# Patient Record
Sex: FEMALE | Race: BLACK OR AFRICAN AMERICAN | NOT HISPANIC OR LATINO
[De-identification: names, ages, dates, MRNs, and addresses within clinical notes are randomized per-mention and may not be internally consistent; named-entity substitution may affect disease eponyms.]

---

## 2022-12-29 ENCOUNTER — TRANSCRIPTION ENCOUNTER (OUTPATIENT)
Age: 52
End: 2022-12-29

## 2022-12-30 ENCOUNTER — INPATIENT (INPATIENT)
Facility: HOSPITAL | Age: 52
LOS: 2 days | Discharge: HOME | End: 2023-01-02
Attending: STUDENT IN AN ORGANIZED HEALTH CARE EDUCATION/TRAINING PROGRAM | Admitting: STUDENT IN AN ORGANIZED HEALTH CARE EDUCATION/TRAINING PROGRAM
Payer: COMMERCIAL

## 2022-12-30 VITALS
TEMPERATURE: 98 F | SYSTOLIC BLOOD PRESSURE: 138 MMHG | RESPIRATION RATE: 30 BRPM | OXYGEN SATURATION: 92 % | HEART RATE: 104 BPM | DIASTOLIC BLOOD PRESSURE: 86 MMHG

## 2022-12-30 DIAGNOSIS — J93.9 PNEUMOTHORAX, UNSPECIFIED: ICD-10-CM

## 2022-12-30 LAB
ALBUMIN SERPL ELPH-MCNC: 4.6 G/DL — SIGNIFICANT CHANGE UP (ref 3.3–5)
ALP SERPL-CCNC: 76 U/L — SIGNIFICANT CHANGE UP (ref 40–120)
ALT FLD-CCNC: 9 U/L — SIGNIFICANT CHANGE UP (ref 4–33)
ANION GAP SERPL CALC-SCNC: 15 MMOL/L — HIGH (ref 7–14)
APTT BLD: 26 SEC — LOW (ref 27–36.3)
AST SERPL-CCNC: 18 U/L — SIGNIFICANT CHANGE UP (ref 4–32)
BASE EXCESS BLDV CALC-SCNC: -2.2 MMOL/L — LOW (ref -2–3)
BASOPHILS # BLD AUTO: 0.02 K/UL — SIGNIFICANT CHANGE UP (ref 0–0.2)
BASOPHILS NFR BLD AUTO: 0.1 % — SIGNIFICANT CHANGE UP (ref 0–2)
BILIRUB SERPL-MCNC: 0.4 MG/DL — SIGNIFICANT CHANGE UP (ref 0.2–1.2)
BLD GP AB SCN SERPL QL: NEGATIVE — SIGNIFICANT CHANGE UP
BLOOD GAS VENOUS COMPREHENSIVE RESULT: SIGNIFICANT CHANGE UP
BUN SERPL-MCNC: 6 MG/DL — LOW (ref 7–23)
CALCIUM SERPL-MCNC: 9.4 MG/DL — SIGNIFICANT CHANGE UP (ref 8.4–10.5)
CHLORIDE BLDV-SCNC: 106 MMOL/L — SIGNIFICANT CHANGE UP (ref 96–108)
CHLORIDE SERPL-SCNC: 103 MMOL/L — SIGNIFICANT CHANGE UP (ref 98–107)
CK MB BLD-MCNC: 3.9 % — HIGH (ref 0–2.5)
CK MB BLD-MCNC: 4.7 % — HIGH (ref 0–2.5)
CK MB CFR SERPL CALC: 5 NG/ML — HIGH
CK MB CFR SERPL CALC: 5.9 NG/ML — HIGH
CK SERPL-CCNC: 125 U/L — SIGNIFICANT CHANGE UP (ref 25–170)
CK SERPL-CCNC: 129 U/L — SIGNIFICANT CHANGE UP (ref 25–170)
CO2 BLDV-SCNC: 25.1 MMOL/L — SIGNIFICANT CHANGE UP (ref 22–26)
CO2 SERPL-SCNC: 22 MMOL/L — SIGNIFICANT CHANGE UP (ref 22–31)
CREAT SERPL-MCNC: 0.89 MG/DL — SIGNIFICANT CHANGE UP (ref 0.5–1.3)
EGFR: 79 ML/MIN/1.73M2 — SIGNIFICANT CHANGE UP
EOSINOPHIL # BLD AUTO: 0 K/UL — SIGNIFICANT CHANGE UP (ref 0–0.5)
EOSINOPHIL NFR BLD AUTO: 0 % — SIGNIFICANT CHANGE UP (ref 0–6)
GAS PNL BLDV: 138 MMOL/L — SIGNIFICANT CHANGE UP (ref 136–145)
GLUCOSE BLDV-MCNC: 123 MG/DL — HIGH (ref 70–99)
GLUCOSE SERPL-MCNC: 129 MG/DL — HIGH (ref 70–99)
HCG SERPL-ACNC: <5 MIU/ML — SIGNIFICANT CHANGE UP
HCO3 BLDV-SCNC: 24 MMOL/L — SIGNIFICANT CHANGE UP (ref 22–29)
HCT VFR BLD CALC: 31.9 % — LOW (ref 34.5–45)
HCT VFR BLDA CALC: 28 % — LOW (ref 34.5–46.5)
HGB BLD CALC-MCNC: 9.3 G/DL — LOW (ref 11.5–15.5)
HGB BLD-MCNC: 9.3 G/DL — LOW (ref 11.5–15.5)
IANC: 11.58 K/UL — HIGH (ref 1.8–7.4)
IMM GRANULOCYTES NFR BLD AUTO: 0.4 % — SIGNIFICANT CHANGE UP (ref 0–0.9)
INR BLD: 1.39 RATIO — HIGH (ref 0.88–1.16)
LACTATE BLDV-MCNC: 2.6 MMOL/L — HIGH (ref 0.5–2)
LIDOCAIN IGE QN: 22 U/L — SIGNIFICANT CHANGE UP (ref 7–60)
LYMPHOCYTES # BLD AUTO: 1.5 K/UL — SIGNIFICANT CHANGE UP (ref 1–3.3)
LYMPHOCYTES # BLD AUTO: 10.5 % — LOW (ref 13–44)
MAGNESIUM SERPL-MCNC: 2.2 MG/DL — SIGNIFICANT CHANGE UP (ref 1.6–2.6)
MCHC RBC-ENTMCNC: 17.4 PG — LOW (ref 27–34)
MCHC RBC-ENTMCNC: 29.2 GM/DL — LOW (ref 32–36)
MCV RBC AUTO: 59.8 FL — LOW (ref 80–100)
MONOCYTES # BLD AUTO: 1.09 K/UL — HIGH (ref 0–0.9)
MONOCYTES NFR BLD AUTO: 7.6 % — SIGNIFICANT CHANGE UP (ref 2–14)
NEUTROPHILS # BLD AUTO: 11.58 K/UL — HIGH (ref 1.8–7.4)
NEUTROPHILS NFR BLD AUTO: 81.4 % — HIGH (ref 43–77)
NRBC # BLD: 0 /100 WBCS — SIGNIFICANT CHANGE UP (ref 0–0)
NRBC # FLD: 0 K/UL — SIGNIFICANT CHANGE UP (ref 0–0)
NT-PROBNP SERPL-SCNC: 2221 PG/ML — HIGH
PCO2 BLDV: 45 MMHG — HIGH (ref 39–42)
PH BLDV: 7.33 — SIGNIFICANT CHANGE UP (ref 7.32–7.43)
PLATELET # BLD AUTO: 611 K/UL — HIGH (ref 150–400)
PO2 BLDV: 29 MMHG — SIGNIFICANT CHANGE UP
POTASSIUM BLDV-SCNC: 3.8 MMOL/L — SIGNIFICANT CHANGE UP (ref 3.5–5.1)
POTASSIUM SERPL-MCNC: 4 MMOL/L — SIGNIFICANT CHANGE UP (ref 3.5–5.3)
POTASSIUM SERPL-SCNC: 4 MMOL/L — SIGNIFICANT CHANGE UP (ref 3.5–5.3)
PROT SERPL-MCNC: 8.5 G/DL — HIGH (ref 6–8.3)
PROTHROM AB SERPL-ACNC: 16.2 SEC — HIGH (ref 10.5–13.4)
RBC # BLD: 5.33 M/UL — HIGH (ref 3.8–5.2)
RBC # FLD: 20.9 % — HIGH (ref 10.3–14.5)
RH IG SCN BLD-IMP: POSITIVE — SIGNIFICANT CHANGE UP
RH IG SCN BLD-IMP: POSITIVE — SIGNIFICANT CHANGE UP
SAO2 % BLDV: 35.4 % — SIGNIFICANT CHANGE UP
SARS-COV-2 RNA SPEC QL NAA+PROBE: SIGNIFICANT CHANGE UP
SODIUM SERPL-SCNC: 140 MMOL/L — SIGNIFICANT CHANGE UP (ref 135–145)
TROPONIN T, HIGH SENSITIVITY RESULT: 56 NG/L — CRITICAL HIGH
TROPONIN T, HIGH SENSITIVITY RESULT: 74 NG/L — CRITICAL HIGH
WBC # BLD: 14.25 K/UL — HIGH (ref 3.8–10.5)
WBC # FLD AUTO: 14.25 K/UL — HIGH (ref 3.8–10.5)

## 2022-12-30 PROCEDURE — 99223 1ST HOSP IP/OBS HIGH 75: CPT | Mod: 57,25

## 2022-12-30 PROCEDURE — 93010 ELECTROCARDIOGRAM REPORT: CPT

## 2022-12-30 PROCEDURE — 71045 X-RAY EXAM CHEST 1 VIEW: CPT | Mod: 26,76

## 2022-12-30 PROCEDURE — 32551 INSERTION OF CHEST TUBE: CPT

## 2022-12-30 PROCEDURE — 99285 EMERGENCY DEPT VISIT HI MDM: CPT

## 2022-12-30 PROCEDURE — 71045 X-RAY EXAM CHEST 1 VIEW: CPT | Mod: 26,77

## 2022-12-30 RX ORDER — FERROUS SULFATE 325(65) MG
325 TABLET ORAL DAILY
Refills: 0 | Status: DISCONTINUED | OUTPATIENT
Start: 2022-12-30 | End: 2023-01-02

## 2022-12-30 RX ORDER — LIDOCAINE HCL 20 MG/ML
10 VIAL (ML) INJECTION ONCE
Refills: 0 | Status: COMPLETED | OUTPATIENT
Start: 2022-12-30 | End: 2022-12-30

## 2022-12-30 RX ORDER — POLYETHYLENE GLYCOL 3350 17 G/17G
17 POWDER, FOR SOLUTION ORAL DAILY
Refills: 0 | Status: DISCONTINUED | OUTPATIENT
Start: 2022-12-30 | End: 2023-01-02

## 2022-12-30 RX ORDER — OXYCODONE HYDROCHLORIDE 5 MG/1
5 TABLET ORAL EVERY 6 HOURS
Refills: 0 | Status: DISCONTINUED | OUTPATIENT
Start: 2022-12-30 | End: 2023-01-02

## 2022-12-30 RX ORDER — HEPARIN SODIUM 5000 [USP'U]/ML
5000 INJECTION INTRAVENOUS; SUBCUTANEOUS EVERY 12 HOURS
Refills: 0 | Status: DISCONTINUED | OUTPATIENT
Start: 2022-12-30 | End: 2023-01-02

## 2022-12-30 RX ORDER — ACETAMINOPHEN 500 MG
650 TABLET ORAL EVERY 6 HOURS
Refills: 0 | Status: DISCONTINUED | OUTPATIENT
Start: 2022-12-30 | End: 2023-01-02

## 2022-12-30 RX ORDER — OXYCODONE HYDROCHLORIDE 5 MG/1
10 TABLET ORAL EVERY 6 HOURS
Refills: 0 | Status: DISCONTINUED | OUTPATIENT
Start: 2022-12-30 | End: 2023-01-02

## 2022-12-30 RX ORDER — MIDAZOLAM HYDROCHLORIDE 1 MG/ML
2 INJECTION, SOLUTION INTRAMUSCULAR; INTRAVENOUS ONCE
Refills: 0 | Status: DISCONTINUED | OUTPATIENT
Start: 2022-12-30 | End: 2022-12-30

## 2022-12-30 RX ORDER — SENNA PLUS 8.6 MG/1
2 TABLET ORAL AT BEDTIME
Refills: 0 | Status: DISCONTINUED | OUTPATIENT
Start: 2022-12-30 | End: 2023-01-02

## 2022-12-30 RX ORDER — ACETAMINOPHEN 500 MG
1000 TABLET ORAL ONCE
Refills: 0 | Status: COMPLETED | OUTPATIENT
Start: 2022-12-30 | End: 2022-12-30

## 2022-12-30 RX ADMIN — MIDAZOLAM HYDROCHLORIDE 2 MILLIGRAM(S): 1 INJECTION, SOLUTION INTRAMUSCULAR; INTRAVENOUS at 16:15

## 2022-12-30 RX ADMIN — Medication 400 MILLIGRAM(S): at 15:15

## 2022-12-30 RX ADMIN — Medication 10 MILLILITER(S): at 18:29

## 2022-12-30 RX ADMIN — SENNA PLUS 2 TABLET(S): 8.6 TABLET ORAL at 22:15

## 2022-12-30 RX ADMIN — MIDAZOLAM HYDROCHLORIDE 2 MILLIGRAM(S): 1 INJECTION, SOLUTION INTRAMUSCULAR; INTRAVENOUS at 17:58

## 2022-12-30 RX ADMIN — OXYCODONE HYDROCHLORIDE 10 MILLIGRAM(S): 5 TABLET ORAL at 22:15

## 2022-12-30 NOTE — ED ADULT NURSE REASSESSMENT NOTE - NS ED NURSE REASSESS COMMENT FT1
Pt resting in bed comfortably, NAD. Pt informed of pneumothorax and need for chest tube placement by provider. Pt agrees, consent signed. Providers at bedside for procedure

## 2022-12-30 NOTE — H&P ADULT - ASSESSMENT
53 yo F h/o anemia, prediabetic, HTN p/w to ER with 5 days of progressively worsening ZELAYA/SOB and exercise intolerance with CXR showing large right pneumothorax.  Right pigtail cath placed with reexpansion, chest tube to suction with forced expiratory air leak.    Plan:   Admit to Thoracic Surgery- Dr. Burton    Neuro: Pain management  Pulm: Encourage coughing, deep breathing and use of incentive spirometry. Wean off supplemental oxygen as able. Daily CXR.   Cardio: Monitor telemetry/alarms  GI: Tolerating diet. Continue stool softeners.  Renal: monitor urine output, supplement electrolytes as needed  Vasc: Heparin SC/SCDs for DVT prophylaxis  Heme: Stable H/H. .   ID: Off antibiotics. Stable.  Therapy: OOB/ambulate  Tubes: Monitor Chest tube output and air leak  Discussed with Dr. Burton

## 2022-12-30 NOTE — H&P ADULT - NSHPLABSRESULTS_GEN_ALL_CORE
Lab Results:  CBC  CBC Full  -  ( 30 Dec 2022 14:20 )  WBC Count : 14.25 K/uL  RBC Count : 5.33 M/uL  Hemoglobin : 9.3 g/dL  Hematocrit : 31.9 %  Platelet Count - Automated : 611 K/uL  Mean Cell Volume : 59.8 fL  Mean Cell Hemoglobin : 17.4 pg  Mean Cell Hemoglobin Concentration : 29.2 gm/dL  Auto Neutrophil # : 11.58 K/uL  Auto Lymphocyte # : 1.50 K/uL  Auto Monocyte # : 1.09 K/uL  Auto Eosinophil # : 0.00 K/uL  Auto Basophil # : 0.02 K/uL  Auto Neutrophil % : 81.4 %  Auto Lymphocyte % : 10.5 %  Auto Monocyte % : 7.6 %  Auto Eosinophil % : 0.0 %  Auto Basophil % : 0.1 %    .		Differential:	[] Automated		[] Manual  Chemistry                        9.3    14.25 )-----------( 611      ( 30 Dec 2022 14:20 )             31.9     12-30    140  |  103  |  6<L>  ----------------------------<  129<H>  4.0   |  22  |  0.89    Ca    9.4      30 Dec 2022 14:20  Mg     2.20     12-30    TPro  8.5<H>  /  Alb  4.6  /  TBili  0.4  /  DBili  x   /  AST  18  /  ALT  9   /  AlkPhos  76  12-30    LIVER FUNCTIONS - ( 30 Dec 2022 14:20 )  Alb: 4.6 g/dL / Pro: 8.5 g/dL / ALK PHOS: 76 U/L / ALT: 9 U/L / AST: 18 U/L / GGT: x           PT/INR - ( 30 Dec 2022 14:20 )   PT: 16.2 sec;   INR: 1.39 ratio         PTT - ( 30 Dec 2022 14:20 )  PTT:26.0 sec      RADIOLOGY RESULTS:  CXR: Large right pneumothorax   Repeat CXR after PTC insertion showing reexpansion

## 2022-12-30 NOTE — H&P ADULT - HISTORY OF PRESENT ILLNESS
53 yo F h/o anemia, prediabetic, HTN p/w to ER with 5 days of progressively worsening ZELAYA/SOB and exercise intolerance. Last night develop SSCP described as pressure-like lasting several hours as well as upper back pain today. Denies fever/chills, recent URI infection, n/v/d, palp, weight change.  Chest x-ray in ER showing large right pneumothorax, pigtail cath attempted by ER twice but unable to place and thoracic surgery contacted for placement of chest tube.  Pigtail cath placed without complication, repeat chest x-ray showing reexpansion.  Patient admitted for management of chest tube.

## 2022-12-30 NOTE — PROCEDURAL SAFETY CHECKLIST WITH OR WITHOUT SEDATION - NSPOSTCOMMENTFT_GEN_ALL_CORE
Pt tolerated procedure well. Pt pending x-ray for placement confirmation. Dressing clean, dry, and intact
cardio thoracic paged for tube insertion

## 2022-12-30 NOTE — ED PROVIDER NOTE - PHYSICAL EXAMINATION
Attending/Jennyfer: +Acc resp mm use; PERRl/EOMI, mucosa-slightly pale; supple, no JVD; RRR, RLL diminished BS; Abd--soft, NT/ND, no LE edema, +2 DP/PT; A&Ox3, nonfocal; Skin-warm/dry Attending/Jennyfer: +Acc resp mm use; PERRl/EOMI, mucosa-slightly pale; supple, no JVD; RRR, RLL diminished BS, sat 87% on RA; Abd--soft, NT/ND, no LE edema, +2 DP/PT; A&Ox3, nonfocal; Skin-warm/dry

## 2022-12-30 NOTE — ED ADULT NURSE NOTE - OBJECTIVE STATEMENT
Pt received in trauma room C A@Ox4, ambulatory at baseline. Pt sister in room at bedside. Pt is a 51 y/o F with PMHx of anemia presents to ED with c/o SOB x 1 week. Pt sister reports SOB began 1 week ago along with ZELAYA. Pt reports 1 episode of non radiating chest pain last night. Pt tachycardic and tachypneic, respirations are even and unlabored, skin intact. Labs drawn and sent as per provider orders. Awaiting CT scan

## 2022-12-30 NOTE — H&P ADULT - NS ATTEND AMEND GEN_ALL_CORE FT
pneumothorax - first known occurrence,  pigtail placement and if resolves can manage conservatively, if fails discussed possible surgical intervention

## 2022-12-30 NOTE — ED ADULT TRIAGE NOTE - CHIEF COMPLAINT QUOTE
Patient brought to ER by EMS from home for shortness of breath and weakness. Pt has anemia and has had this happen before.  Patient brought to ER by EMS from home for shortness of breath, weakness and back pain. Pt has anemia and has had this happen before.  Patient brought to ER by EMS from home for shortness of breath, weakness and back pain. Pt has anemia and has had this happen before.   Evaluation of EKG>R/O Stemi

## 2022-12-30 NOTE — H&P ADULT - NSHPPHYSICALEXAM_GEN_ALL_CORE
Vital Signs Last 24 Hrs  T(C): 36.7 (30 Dec 2022 14:11), Max: 36.8 (30 Dec 2022 13:16)  T(F): 98 (30 Dec 2022 14:11), Max: 98.2 (30 Dec 2022 13:16)  HR: 84 (30 Dec 2022 20:05) (84 - 112)  BP: 103/71 (30 Dec 2022 20:05) (103/71 - 142/88)  BP(mean): --  RR: 17 (30 Dec 2022 20:05) (17 - 32)  SpO2: 100% (30 Dec 2022 20:05) (92% - 100%)    Parameters below as of 30 Dec 2022 18:16  Patient On (Oxygen Delivery Method): mask, nonrebreather  O2 Flow (L/min): 15    General: WN/WD NAD  Neurology: A&Ox3, nonfocal, YANEZ x 4  Eyes: PERRLA/ EOMI, Gross vision intact  ENT/Neck: Neck supple, trachea midline, No JVD, Gross hearing intact  Respiratory: decreased right side   CV: RRR, S1S2, no murmurs, rubs or gallops  Abdominal: Soft, NT, ND +BS,   Extremities: No edema, + peripheral pulses  Skin: No Rashes, Hematoma, Ecchymosis  Tubes: Right chest tube to suction, +AL

## 2022-12-30 NOTE — ED PROVIDER NOTE - OBJECTIVE STATEMENT
Attending/Jennyfer: 51 yo F h/o anemia on Iron suppl (no h/o transfusion), prediabetic, HTN p/w ~5 days of progressively worsening ZELAYA/SOB and exercise tolerance. Last night develop SSCP described as pressure-like lasting several hours as well as upper back pain today. Denies fever/chills, recent URI infection, n/v/d, palp, weight change. Attending/Jennyfer: 53 yo F h/o anemia on Iron suppl (no h/o transfusion), prediabetic, HTN p/w ~5 days of progressively worsening ZELAYA/SOB and exercise intolerance. Last night develop SSCP described as pressure-like lasting several hours as well as upper back pain today. Denies fever/chills, recent URI infection, n/v/d, palp, weight change.

## 2022-12-30 NOTE — ED PROVIDER NOTE - PROGRESS NOTE DETAILS
Jennyfer: cardiology at bedside, believe not acute STEMI. Work up plan d/w patient and her sister who is at bedside. Rommel Berkowitz MD (PGY2): CXR w/ large R pneumothorax. Confirmed with call from radiology. will place pigtail catheter. Jaz Meeks PGY3: Rt-sided pigtail catheter placement attempted x 2 and unsuccessful. Given this, CT surgery consulted for chest tube placement. Jaz Meeks PGY3: Pigtail placed by CT Surgery. Awaiting confirmatory CXR. Will admit to CT surgery service for monitoring.

## 2022-12-30 NOTE — ED ADULT NURSE NOTE - CHIEF COMPLAINT QUOTE
Patient brought to ER by EMS from home for shortness of breath, weakness and back pain. Pt has anemia and has had this happen before.   Evaluation of EKG>R/O Stemi

## 2022-12-30 NOTE — ED PROCEDURE NOTE - PROCEDURE ADDITIONAL DETAILS
On post-procedure radiograph, catheter not completely advanced. Procedure to be re-attempted.
attempted chest tube, unable to fully insert. Aborted procedure. All materials removed and accounted for. Sterile dress placed.

## 2022-12-30 NOTE — H&P ADULT - NSHPREVIEWOFSYSTEMS_GEN_ALL_CORE
REVIEW OF SYSTEMS      General:No Weight change/ Fatigue/ HA/Dizzy	    Skin/Breast: No Rashes/ Lesions/ Masses  	  Ophthalmologic: No Blurry vision/ Glaucoma/ Blindness  	  ENMT: No Hearing loss/ Drainage/ Lesions	    Respiratory and Thorax: + SOB/ ZELAYA    Cardiovascular: +Chest pain 	    Gastrointestinal: No Nausea/ Vomiting/ Constipation/ Appetite Change	    Genitourinary: No Heamturia/ Dysuria/ Frequency change/ Impotence	    Musculoskeletal: No Pain/ Weakness/ Claudication	    Neurological: No Seizures/ TIA/CVA/ Parastesias	    Psychiatric: No Dementia/ Depression/ SI/HI	    Hematology/Lymphatics: No hx of bleeding/ Edema	    Endocrine:	No Hyperglycemia/ Hypoglycemia    Allergic/Immunologic:	 No Anaphylaxis/ Intolerance/ Recent illnesses

## 2022-12-30 NOTE — PATIENT PROFILE ADULT - FALL HARM RISK - RISK INTERVENTIONS
Assistance OOB with selected safe patient handling equipment/Assistance with ambulation/Communicate Fall Risk and Risk Factors to all staff, patient, and family/Monitor gait and stability/Reinforce activity limits and safety measures with patient and family/Sit up slowly, dangle for a short time, stand at bedside before walking/Use of alarms - bed, chair and/or voice tab/Visual Cue: Yellow wristband/Bed in lowest position, wheels locked, appropriate side rails in place/Call bell, personal items and telephone in reach/Instruct patient to call for assistance before getting out of bed or chair/Non-slip footwear when patient is out of bed/[place] to call system/Physically safe environment - no spills, clutter or unnecessary equipment/Purposeful Proactive Rounding/Room/bathroom lighting operational, light cord in reach

## 2022-12-30 NOTE — PATIENT PROFILE ADULT - NSPROIMPLANTSMEDDEV_GEN_A_NUR
Health Maintenance Due   Topic Date Due   • Pneumococcal Vaccine 0-64 (1 of 1 - PPSV23) 10/02/1958   • DTaP/Tdap/Td Vaccine (1 - Tdap) 10/02/1973   • Shingles Vaccine (1 of 2) 10/02/2004   • Breast Cancer Screening  10/10/2014   • Cervical Cancer Screening  10/19/2018       Patient is due for topics as listed above but is not proceeding with Immunization(s) Dtap/Tdap/Td and Shingles, Cervical cancer screening and Mammogram at this time. Pt is aware she need to schedule for her pap, mammogram, refused all vaccines today.         None

## 2022-12-30 NOTE — ED PROVIDER NOTE - CLINICAL SUMMARY MEDICAL DECISION MAKING FREE TEXT BOX
A/P 51 yo F h/o anemia, predibetic, HTN only on iron suppl p/w with worsening SOB/ZELAYA with transient SSCP. exam noted for RLL effusion, hypoxic r/o PE, anemia, ACS  -Screening EKG, CXR, labs including T&S, CT, cardiac monitoring A/P 51 yo F h/o anemia, predibetic, HTN only on iron suppl p/w with worsening SOB/ZELAYA with transient SSCP. exam noted for RLL effusion, hypoxic, sat 87% on RA r/o PE, anemia, ACS  -Screening EKG, CXR, labs including T&S, CT, cardiac monitoring

## 2022-12-30 NOTE — ED ADULT NURSE NOTE - NSSEPSISCRITERIAMET_ED_A_ED
exudates on left./OROPHARYNGEAL EXUDATE Abnormal VS & WBC/Abnormal Lactate THROAT RED/exudates on left, tonsils symmetrical/uvula midline/no vesicles/NO VESICLES/ULCERS/NO DROOLING/NO TONGUE ELEVATION/OROPHARYNGEAL EXUDATE

## 2022-12-31 ENCOUNTER — TRANSCRIPTION ENCOUNTER (OUTPATIENT)
Age: 52
End: 2022-12-31

## 2022-12-31 PROCEDURE — 99232 SBSQ HOSP IP/OBS MODERATE 35: CPT

## 2022-12-31 PROCEDURE — 99232 SBSQ HOSP IP/OBS MODERATE 35: CPT | Mod: 57

## 2022-12-31 RX ADMIN — HEPARIN SODIUM 5000 UNIT(S): 5000 INJECTION INTRAVENOUS; SUBCUTANEOUS at 05:50

## 2022-12-31 RX ADMIN — HEPARIN SODIUM 5000 UNIT(S): 5000 INJECTION INTRAVENOUS; SUBCUTANEOUS at 18:35

## 2022-12-31 RX ADMIN — Medication 325 MILLIGRAM(S): at 12:19

## 2023-01-01 PROCEDURE — 99233 SBSQ HOSP IP/OBS HIGH 50: CPT | Mod: 57

## 2023-01-01 PROCEDURE — 71045 X-RAY EXAM CHEST 1 VIEW: CPT | Mod: 26

## 2023-01-01 RX ADMIN — HEPARIN SODIUM 5000 UNIT(S): 5000 INJECTION INTRAVENOUS; SUBCUTANEOUS at 18:07

## 2023-01-01 RX ADMIN — OXYCODONE HYDROCHLORIDE 10 MILLIGRAM(S): 5 TABLET ORAL at 01:37

## 2023-01-01 RX ADMIN — SENNA PLUS 2 TABLET(S): 8.6 TABLET ORAL at 21:40

## 2023-01-01 RX ADMIN — OXYCODONE HYDROCHLORIDE 10 MILLIGRAM(S): 5 TABLET ORAL at 01:04

## 2023-01-01 RX ADMIN — Medication 325 MILLIGRAM(S): at 12:58

## 2023-01-01 RX ADMIN — HEPARIN SODIUM 5000 UNIT(S): 5000 INJECTION INTRAVENOUS; SUBCUTANEOUS at 06:23

## 2023-01-01 NOTE — DISCHARGE NOTE PROVIDER - NSDCFUADDAPPT_GEN_ALL_CORE_FT
See Dr Burton in 2 weeks- call for an appointment and bring a new chest X-ray with you when you come.

## 2023-01-01 NOTE — DISCHARGE NOTE PROVIDER - NSDCMRMEDTOKEN_GEN_ALL_CORE_FT
ferrous sulfate 325 mg (65 mg elemental iron) oral tablet: 1 tab(s) orally once a day   ferrous sulfate 325 mg (65 mg elemental iron) oral tablet: 1 tab(s) orally once a day  oxyCODONE 5 mg oral tablet: 1 tab(s) orally every 6 hours, As needed, Moderate Pain (4 - 6) MDD:4 tabs  polyethylene glycol 3350 oral powder for reconstitution: 17 gram(s) orally once a day  senna leaf extract oral tablet: 2 tab(s) orally once a day (at bedtime)

## 2023-01-01 NOTE — DISCHARGE NOTE PROVIDER - NSDCCPCAREPLAN_GEN_ALL_CORE_FT
PRINCIPAL DISCHARGE DIAGNOSIS  Diagnosis: Pneumothorax, right  Assessment and Plan of Treatment:        PRINCIPAL DISCHARGE DIAGNOSIS  Diagnosis: Pneumothorax, right  Assessment and Plan of Treatment: - Follow up with Dr. Burton in 1-2 weeks [phone]. Please call the office to make an appointment.   - Have your Chest x-ray done 1-2 days prior to your appointment.    - Please bring copy of x-ray to your appointment.  - Follow up with primary care provider in one week.  - Take the prescribed pain medication ONLY as needed.  - Can use over the counter Ibuprofen & or Tylenol as directed on the bottle.   - Please take a laxative or stool softeners to help support your bowel movements while on narcotic pain medication as it can cause constipation. Laxatives & stool softeners can be available over the counter at your local pharmacy.

## 2023-01-01 NOTE — DISCHARGE NOTE PROVIDER - CARE PROVIDER_API CALL
Lisa Burton)  Surgery; Thoracic Surgery  [phone] [place] [address]  Phone: [phone]  Fax: [phone]  Established Patient  Follow Up Time: 2 weeks

## 2023-01-01 NOTE — DISCHARGE NOTE PROVIDER - HOSPITAL COURSE
53 yo F with h/o anemia, prediabetes, & HTN presented to the ER with 5 days of progressively worsening ZELAYA/SOB and exercise intolerance. She also noted pressure-like SSCP for several hours the night PTA and upper back pain on the day of admission. A chest x-ray in the ER showed a large right pneumothorax and pigtail catheter placement was unsuccessfully attempted by ER twice. Thoracic surgery was contacted for placement of a chest tube. and a pigtail catheter was subsequently placed without complications. A repeat chest x-ray showed reexpansion of the lung.  The patient admitted for management of the chest tube.  On 1/1/23 the tube was taken off suction and the pt was for discharge home after it was removed. 53 yo F with h/o anemia, prediabetes, & HTN presented to the ER with 5 days of progressively worsening ZELAYA/SOB and exercise intolerance. She also noted pressure-like SSCP for several hours the night PTA and upper back pain on the day of admission. A chest x-ray in the ER showed a large right pneumothorax and pigtail catheter placement was unsuccessfully attempted by ER twice. Thoracic surgery was contacted for placement of a chest tube. and a pigtail catheter was subsequently placed without complications. A repeat chest x-ray showed reexpansion of the lung.  The patient admitted for management of the chest tube.  On 1/1/23 the tube was taken off suction and the pt was for discharge home after it was removed on 1/2/22.

## 2023-01-01 NOTE — DISCHARGE NOTE PROVIDER - NSDCFUADDINST_GEN_ALL_CORE_FT
Keep the chest tube dressings in place for two days and then remove them so that you can shower.  Wash with soap and water and leave the chest tube site open to air to dry.  Some drainage is normal but watch for pus, increased redness, fevers, or difficulty breathing and if noted, call Dr Burton.

## 2023-01-02 ENCOUNTER — TRANSCRIPTION ENCOUNTER (OUTPATIENT)
Age: 53
End: 2023-01-02

## 2023-01-02 VITALS
SYSTOLIC BLOOD PRESSURE: 152 MMHG | RESPIRATION RATE: 18 BRPM | HEART RATE: 88 BPM | OXYGEN SATURATION: 94 % | DIASTOLIC BLOOD PRESSURE: 61 MMHG | TEMPERATURE: 99 F

## 2023-01-02 LAB
ANION GAP SERPL CALC-SCNC: 9 MMOL/L — SIGNIFICANT CHANGE UP (ref 7–14)
BUN SERPL-MCNC: 5 MG/DL — LOW (ref 7–23)
CALCIUM SERPL-MCNC: 8.9 MG/DL — SIGNIFICANT CHANGE UP (ref 8.4–10.5)
CHLORIDE SERPL-SCNC: 101 MMOL/L — SIGNIFICANT CHANGE UP (ref 98–107)
CO2 SERPL-SCNC: 25 MMOL/L — SIGNIFICANT CHANGE UP (ref 22–31)
CREAT SERPL-MCNC: 0.89 MG/DL — SIGNIFICANT CHANGE UP (ref 0.5–1.3)
EGFR: 79 ML/MIN/1.73M2 — SIGNIFICANT CHANGE UP
GLUCOSE SERPL-MCNC: 101 MG/DL — HIGH (ref 70–99)
HCT VFR BLD CALC: 25.7 % — LOW (ref 34.5–45)
HCT VFR BLD CALC: 26.5 % — LOW (ref 34.5–45)
HGB BLD-MCNC: 7.4 G/DL — LOW (ref 11.5–15.5)
HGB BLD-MCNC: 7.5 G/DL — LOW (ref 11.5–15.5)
MAGNESIUM SERPL-MCNC: 2.3 MG/DL — SIGNIFICANT CHANGE UP (ref 1.6–2.6)
MCHC RBC-ENTMCNC: 17 PG — LOW (ref 27–34)
MCHC RBC-ENTMCNC: 17.2 PG — LOW (ref 27–34)
MCHC RBC-ENTMCNC: 28.3 GM/DL — LOW (ref 32–36)
MCHC RBC-ENTMCNC: 28.8 GM/DL — LOW (ref 32–36)
MCV RBC AUTO: 59.8 FL — LOW (ref 80–100)
MCV RBC AUTO: 60 FL — LOW (ref 80–100)
NRBC # BLD: 0 /100 WBCS — SIGNIFICANT CHANGE UP (ref 0–0)
NRBC # BLD: 0 /100 WBCS — SIGNIFICANT CHANGE UP (ref 0–0)
NRBC # FLD: 0 K/UL — SIGNIFICANT CHANGE UP (ref 0–0)
NRBC # FLD: 0.03 K/UL — HIGH (ref 0–0)
PHOSPHATE SERPL-MCNC: 3.7 MG/DL — SIGNIFICANT CHANGE UP (ref 2.5–4.5)
PLATELET # BLD AUTO: 346 K/UL — SIGNIFICANT CHANGE UP (ref 150–400)
PLATELET # BLD AUTO: 352 K/UL — SIGNIFICANT CHANGE UP (ref 150–400)
POTASSIUM SERPL-MCNC: 3.5 MMOL/L — SIGNIFICANT CHANGE UP (ref 3.5–5.3)
POTASSIUM SERPL-SCNC: 3.5 MMOL/L — SIGNIFICANT CHANGE UP (ref 3.5–5.3)
RBC # BLD: 4.3 M/UL — SIGNIFICANT CHANGE UP (ref 3.8–5.2)
RBC # BLD: 4.42 M/UL — SIGNIFICANT CHANGE UP (ref 3.8–5.2)
RBC # FLD: 19.9 % — HIGH (ref 10.3–14.5)
RBC # FLD: 20.1 % — HIGH (ref 10.3–14.5)
SODIUM SERPL-SCNC: 135 MMOL/L — SIGNIFICANT CHANGE UP (ref 135–145)
WBC # BLD: 9.02 K/UL — SIGNIFICANT CHANGE UP (ref 3.8–10.5)
WBC # BLD: 9.32 K/UL — SIGNIFICANT CHANGE UP (ref 3.8–10.5)
WBC # FLD AUTO: 9.02 K/UL — SIGNIFICANT CHANGE UP (ref 3.8–10.5)
WBC # FLD AUTO: 9.32 K/UL — SIGNIFICANT CHANGE UP (ref 3.8–10.5)

## 2023-01-02 PROCEDURE — 99233 SBSQ HOSP IP/OBS HIGH 50: CPT | Mod: 57

## 2023-01-02 PROCEDURE — 71045 X-RAY EXAM CHEST 1 VIEW: CPT | Mod: 26,76

## 2023-01-02 RX ORDER — POLYETHYLENE GLYCOL 3350 17 G/17G
17 POWDER, FOR SOLUTION ORAL
Qty: 0 | Refills: 0 | DISCHARGE
Start: 2023-01-02

## 2023-01-02 RX ORDER — OXYCODONE HYDROCHLORIDE 5 MG/1
1 TABLET ORAL
Qty: 20 | Refills: 0
Start: 2023-01-02 | End: 2023-01-06

## 2023-01-02 RX ORDER — POTASSIUM CHLORIDE 20 MEQ
40 PACKET (EA) ORAL ONCE
Refills: 0 | Status: COMPLETED | OUTPATIENT
Start: 2023-01-02 | End: 2023-01-02

## 2023-01-02 RX ORDER — SENNA PLUS 8.6 MG/1
2 TABLET ORAL
Qty: 0 | Refills: 0 | DISCHARGE
Start: 2023-01-02

## 2023-01-02 RX ADMIN — OXYCODONE HYDROCHLORIDE 10 MILLIGRAM(S): 5 TABLET ORAL at 06:30

## 2023-01-02 RX ADMIN — Medication 325 MILLIGRAM(S): at 12:01

## 2023-01-02 RX ADMIN — Medication 40 MILLIEQUIVALENT(S): at 12:01

## 2023-01-02 RX ADMIN — OXYCODONE HYDROCHLORIDE 10 MILLIGRAM(S): 5 TABLET ORAL at 05:50

## 2023-01-02 RX ADMIN — HEPARIN SODIUM 5000 UNIT(S): 5000 INJECTION INTRAVENOUS; SUBCUTANEOUS at 05:50

## 2023-01-02 NOTE — DISCHARGE NOTE NURSING/CASE MANAGEMENT/SOCIAL WORK - PATIENT PORTAL LINK FT
You can access the FollowMyHealth Patient Portal offered by [facility] by registering at the following website: [email or web address]. By joining [facility] [employer]’s FollowMyHealth portal, you will also be able to view your health information using other applications (apps) compatible with our system.

## 2023-01-02 NOTE — PROGRESS NOTE ADULT - ASSESSMENT
Assessment: 51 yo F h/o anemia, prediabetic, HTN p/w to ER with 5 days of progressively worsening ZELAYA/SOB and exercise intolerance. Last night develop SSCP described as pressure-like lasting several hours as well as upper back pain today. Denies fever/chills, recent URI infection, n/v/d, palp, weight change.  Chest x-ray in ER showing large right pneumothorax, pigtail cath attempted by ER twice but unable to place and thoracic surgery contacted for placement of chest tube.  Pigtail cath placed without complication, repeat chest x-ray showing reexpansion.  Patient admitted for management of chest tube.      PLAN  Neuro: Pain management  Pulm: Encourage coughing, deep breathing and use of incentive spirometry. Wean off supplemental oxygen as able. Daily CXR.   Cardio: Monitor telemetry/alarms  GI: Tolerating diet. Continue stool softeners.  Renal: monitor urine output, supplement electrolytes as needed  Vasc: Heparin SC/SCDs for DVT prophylaxis  Heme: Stable H/H. .   ID: Off antibiotics. Stable.  Therapy: OOB/ambulate  Tubes: Monitor Chest tube output, keep on -40 suction, possible WS trial later today  Disposition: Aim to D/C to home once surgically stable     Thoracic Surgery  [id number]

## 2023-01-02 NOTE — PROGRESS NOTE ADULT - SUBJECTIVE AND OBJECTIVE BOX
Subjective:" I'm feeling better"  pt denies chest pain or shortness of breath  ambulatory in room ad ezequiel, pt can ambulate off suction    Vital Signs:  Vital Signs Last 24 Hrs  T(C): 36.9 (01-01-23 @ 08:56), Max: 37.6 (12-31-22 @ 18:17)  T(F): 98.5 (01-01-23 @ 08:56), Max: 99.6 (12-31-22 @ 18:17)  HR: 85 (01-01-23 @ 08:56) (83 - 94)  BP: 112/58 (01-01-23 @ 08:56) (112/58 - 131/59)  RR: 17 (01-01-23 @ 08:56) (17 - 19)  SpO2: 93% (01-01-23 @ 08:56) (93% - 100%) on (O2)    PHYSICAL EXAM  Telemetry/Alarms: SR  General: WN/WD NAD  Neurology: Awake, nonfocal, YANEZ x 4  Eyes: Scleras clear, PERRLA/ EOMI, Gross vision intact  ENT:Gross hearing intact, grossly patent pharynx, no stridor  Neck: Neck supple, trachea midline, No JVD,   Respiratory: CTA B/L, No wheezing, rales, rhonchi  CV: RRR, S1S2, no murmurs, rubs or gallops  Abdominal: Soft, NT, ND +BS,   Extremities: No edema, + peripheral pulses  Skin: No Rashes, Hematoma, Ecchymosis  Lymphatic: No Neck, axilla, groin LAD  Psych: Oriented x 3, normal affect  Incisions: C,D,I  Tubes: R PTC increased to -40 suction   70cc serosanguinous pleural fluid   Relevant labs, radiology and Medications reviewed                        9.3    14.25 )-----------( 611      ( 30 Dec 2022 14:20 )             31.9     12-30    140  |  103  |  6<L>  ----------------------------<  129<H>  4.0   |  22  |  0.89    Ca    9.4      30 Dec 2022 14:20  Mg     2.20     12-30    TPro  8.5<H>  /  Alb  4.6  /  TBili  0.4  /  DBili  x   /  AST  18  /  ALT  9   /  AlkPhos  76  12-30    PT/INR - ( 30 Dec 2022 14:20 )   PT: 16.2 sec;   INR: 1.39 ratio         PTT - ( 30 Dec 2022 14:20 )  PTT:26.0 sec  MEDICATIONS  (STANDING):  ferrous    sulfate 325 milliGRAM(s) Oral daily  heparin   Injectable 5000 Unit(s) SubCutaneous every 12 hours  polyethylene glycol 3350 17 Gram(s) Oral daily  senna 2 Tablet(s) Oral at bedtime    MEDICATIONS  (PRN):  acetaminophen     Tablet .. 650 milliGRAM(s) Oral every 6 hours PRN Mild Pain (1 - 3)  oxyCODONE    IR 5 milliGRAM(s) Oral every 6 hours PRN Moderate Pain (4 - 6)  oxyCODONE    IR 10 milliGRAM(s) Oral every 6 hours PRN Severe Pain (7 - 10)    Pertinent Physical Exam  I&O's Summary    31 Dec 2022 07:01  -  01 Jan 2023 07:00  --------------------------------------------------------  IN: 960 mL / OUT: 1788 mL / NET: -828 mL    01 Jan 2023 07:01  -  01 Jan 2023 11:24  --------------------------------------------------------  IN: 100 mL / OUT: 300 mL / NET: -200 mL       Social History:  · Substance use	No  · Social History (marital status, living situation, occupation, and sexual history)	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	No      Assessment  52y Female  w/ PAST MEDICAL & SURGICAL HISTORY:  Anemia  Hypertension  prediabetes  admitted with complaints of Patient is a 52y old  Female who presents with a chief complaint of Right pneumothorax (30 Dec 2022 20:18)  53 yo F h/o anemia, prediabetic, HTN p/w to ER with 5 days of progressively worsening ZELAYA/SOB and exercise intolerance. Last night develop SSCP described as pressure-like lasting several hours as well as upper back pain today. Denies fever/chills, recent URI infection, n/v/d, palp, weight change.  Chest x-ray in ER showing large right pneumothorax, pigtail cath attempted by ER twice but unable to place and thoracic surgery contacted for placement of chest tube.  Pigtail cath placed without complication, repeat chest x-ray showing reexpansion.  Patient admitted for management of chest tube.      PLAN  Neuro: Pain management  Pulm: Encourage coughing, deep breathing and use of incentive spirometry. Wean off supplemental oxygen as able. Daily CXR.   Cardio: Monitor telemetry/alarms  GI: Tolerating diet. Continue stool softeners.  Renal: monitor urine output, supplement electrolytes as needed  Vasc: Heparin SC/SCDs for DVT prophylaxis  Heme: Stable H/H. .   ID: Off antibiotics. Stable.  Therapy: OOB/ambulate  Tubes: Monitor Chest tube output, keep on -40 suction  Disposition: Aim to D/C to home once surgically stable.   Discussed with Cardiothoracic Team at AM rounds.
   Subjective: "It's a little sore where the tube is". Pt states SOB improved since CT insertion. Weaning down O2. OOB in chair.  Pt states her LMP started on Dec 25th and the sharp pain/SOB started 2-3 days later. Pt states long and heavy menses for years. Hasn't been to a GYN in over 5yrs.     Vital Signs:  Vital Signs Last 24 Hrs  T(C): 36.7 (12-31-22 @ 08:55), Max: 37.1 (12-30-22 @ 23:20)  T(F): 98.1 (12-31-22 @ 08:55), Max: 98.7 (12-30-22 @ 23:20)  HR: 80 (12-31-22 @ 08:55) (80 - 112)  BP: 109/54 (12-31-22 @ 08:55) (96/55 - 142/88)  RR: 18 (12-31-22 @ 09:01) (17 - 32)  SpO2: 98% (12-31-22 @ 09:01) (87% - 100%) on (O2)    Telemetry/Alarms: Tele SR  General: WN/WD NAD  Neurology: Awake, nonfocal, YANEZ x 4  Eyes: Scleras clear, PERRLA/ EOMI, Gross vision intact  ENT:Gross hearing intact, grossly patent pharynx, no stridor  Neck: Neck supple, trachea midline, No JVD,   Respiratory: Dec'd BS to Rt side  CV: RRR, S1S2, no murmurs, rubs or gallops  Abdominal: Soft, NT, ND +BS,   Extremities: No edema, + peripheral pulses  Skin: No Rashes, Hematoma, Ecchymosis  Lymphatic: No Neck, axilla, groin LAD  Psych: Oriented x 3, normal affect    Tubes: Rt PTC 100cc to Sxn, no AL.   Relevant labs, radiology and Medications reviewed                        9.3    14.25 )-----------( 611      ( 30 Dec 2022 14:20 )             31.9     12-30    140  |  103  |  6<L>  ----------------------------<  129<H>  4.0   |  22  |  0.89    Ca    9.4      30 Dec 2022 14:20  Mg     2.20     12-30    TPro  8.5<H>  /  Alb  4.6  /  TBili  0.4  /  DBili  x   /  AST  18  /  ALT  9   /  AlkPhos  76  12-30    PT/INR - ( 30 Dec 2022 14:20 )   PT: 16.2 sec;   INR: 1.39 ratio         PTT - ( 30 Dec 2022 14:20 )  PTT:26.0 sec  MEDICATIONS  (STANDING):  ferrous    sulfate 325 milliGRAM(s) Oral daily  heparin   Injectable 5000 Unit(s) SubCutaneous every 12 hours  polyethylene glycol 3350 17 Gram(s) Oral daily  senna 2 Tablet(s) Oral at bedtime    MEDICATIONS  (PRN):  acetaminophen     Tablet .. 650 milliGRAM(s) Oral every 6 hours PRN Mild Pain (1 - 3)  oxyCODONE    IR 5 milliGRAM(s) Oral every 6 hours PRN Moderate Pain (4 - 6)  oxyCODONE    IR 10 milliGRAM(s) Oral every 6 hours PRN Severe Pain (7 - 10)    Pertinent Physical Exam  I&O's Summary    30 Dec 2022 07:01  -  31 Dec 2022 07:00  --------------------------------------------------------  IN: 0 mL / OUT: 70 mL / NET: -70 mL    31 Dec 2022 07:01  -  31 Dec 2022 09:27  --------------------------------------------------------  IN: 0 mL / OUT: 50 mL / NET: -50 mL    Social History:  · Substance use	No  · Social History (marital status, living situation, occupation, and sexual history)	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	No      Assessment  52y Female  w/ PAST MEDICAL & SURGICAL HISTORY:  Anemia      Hypertension      Prediabetes      admitted with complaints of Patient is a 52y old  Female who presents with a chief complaint of Right pneumothorax (30 Dec 2022 20:18)  53 yo F h/o anemia, prediabetic, HTN p/w to ER with 5 days of progressively worsening ZELAYA/SOB and exercise intolerance. Last night develop SSCP described as pressure-like lasting several hours as well as upper back pain today. Denies fever/chills, recent URI infection, n/v/d, palp, weight change.  Chest x-ray in ER showing large right pneumothorax, pigtail cath attempted by ER twice but unable to place and thoracic surgery contacted for placement of chest tube.  Pigtail cath placed without complication, repeat chest x-ray showing reexpansion.  Patient admitted for management of chest tube.      PLAN  Neuro: Pain management  Pulm: Encourage coughing, deep breathing and use of incentive spirometry. Wean off supplemental oxygen as able. Daily CXR.   Cardio: Monitor telemetry/alarms  GI: Tolerating diet. Continue stool softeners.  Renal: monitor urine output, supplement electrolytes as needed  Vasc: Heparin SC/SCDs for DVT prophylaxis  Heme: Stable H/H. .   ID: Off antibiotics. Stable.  Therapy: OOB/ambulate  Tubes: Monitor Chest tube output, keep on suction  Disposition: Aim to D/C to home once surgically stable.   Discussed with Cardiothoracic Team at AM rounds.  
Thoracic Surgery Progress Note    SUBJECTIVE: Patient seen and examined at bedside with surgical team. Pt endorsing mild tenderness at CT insertion site, denies any difficulty breathing. Pt did not endorse any chest pain, SOB, fevers/chills, N/V/D.    Vital Signs Last 24 Hrs  T(C): 37.7 (02 Jan 2023 04:00), Max: 37.7 (02 Jan 2023 04:00)  T(F): 99.9 (02 Jan 2023 04:00), Max: 99.9 (02 Jan 2023 04:00)  HR: 84 (02 Jan 2023 05:38) (80 - 89)  BP: 115/67 (02 Jan 2023 05:38) (108/55 - 121/73)  BP(mean): --  RR: 18 (02 Jan 2023 05:38) (17 - 18)  SpO2: 99% (02 Jan 2023 05:38) (93% - 100%)    Parameters below as of 02 Jan 2023 05:38  Patient On (Oxygen Delivery Method): room air    I&O's Detail    01 Jan 2023 07:01  -  02 Jan 2023 07:00  --------------------------------------------------------  IN:    Oral Fluid: 1210 mL  Total IN: 1210 mL    OUT:    Chest Tube (mL): 18 mL    Stool (mL): 0 mL    Voided (mL): 2250 mL  Total OUT: 2268 mL    Total NET: -1058 mL        Medications  MEDICATIONS  (STANDING):  ferrous    sulfate 325 milliGRAM(s) Oral daily  heparin   Injectable 5000 Unit(s) SubCutaneous every 12 hours  polyethylene glycol 3350 17 Gram(s) Oral daily  senna 2 Tablet(s) Oral at bedtime    MEDICATIONS  (PRN):  acetaminophen     Tablet .. 650 milliGRAM(s) Oral every 6 hours PRN Mild Pain (1 - 3)  oxyCODONE    IR 5 milliGRAM(s) Oral every 6 hours PRN Moderate Pain (4 - 6)  oxyCODONE    IR 10 milliGRAM(s) Oral every 6 hours PRN Severe Pain (7 - 10)      Physical Exam  Telemetry/Alarms: SR  General: WN/WD NAD  Neurology: Awake, nonfocal, YANZE x 4  Eyes: Scleras clear, PERRLA/ EOMI, Gross vision intact  ENT:Gross hearing intact, grossly patent pharynx, no stridor  Neck: Neck supple, trachea midline, No JVD,   Respiratory: CTA B/L, No wheezing, rales, rhonchi  CV: RRR, S1S2, no murmurs, rubs or gallops  Abdominal: Soft, NT, ND +BS,   Extremities: No edema, + peripheral pulses  Skin: No Rashes, Hematoma, Ecchymosis  Lymphatic: No Neck, axilla, groin LAD  Psych: Oriented x 3, normal affect  Incisions: C/D/I  Tubes: R PTC increased to -40 suction   18cc serosanguinous pleural fluid, no AL    LABS:

## 2023-01-02 NOTE — PROGRESS NOTE ADULT - NS ATTEND AMEND GEN_ALL_CORE FT
keeping to suction today. possible water seal tomorrow
no air leak today, lung expanded. plan for water seal and repeat cxr in four hours. if still okay - remove tube and can f/u as outpatient. if fails discussed possible surgery this week
questionable space on cxr. new - unkinked the tube and able to evacuate small amount of air - given findings will leave tube to suction today

## 2023-01-04 ENCOUNTER — EMERGENCY (EMERGENCY)
Facility: HOSPITAL | Age: 53
LOS: 1 days | Discharge: HOME | End: 2023-01-04
Attending: STUDENT IN AN ORGANIZED HEALTH CARE EDUCATION/TRAINING PROGRAM | Admitting: STUDENT IN AN ORGANIZED HEALTH CARE EDUCATION/TRAINING PROGRAM
Payer: COMMERCIAL

## 2023-01-04 VITALS
DIASTOLIC BLOOD PRESSURE: 69 MMHG | TEMPERATURE: 98 F | RESPIRATION RATE: 16 BRPM | SYSTOLIC BLOOD PRESSURE: 120 MMHG | HEART RATE: 74 BPM | OXYGEN SATURATION: 99 %

## 2023-01-04 VITALS
TEMPERATURE: 98 F | SYSTOLIC BLOOD PRESSURE: 134 MMHG | OXYGEN SATURATION: 100 % | DIASTOLIC BLOOD PRESSURE: 74 MMHG | HEART RATE: 104 BPM | HEIGHT: 70 IN | RESPIRATION RATE: 16 BRPM

## 2023-01-04 LAB
ALBUMIN SERPL ELPH-MCNC: 3.8 G/DL — SIGNIFICANT CHANGE UP (ref 3.3–5)
ALP SERPL-CCNC: 73 U/L — SIGNIFICANT CHANGE UP (ref 40–120)
ALT FLD-CCNC: 15 U/L — SIGNIFICANT CHANGE UP (ref 4–33)
ANION GAP SERPL CALC-SCNC: 12 MMOL/L — SIGNIFICANT CHANGE UP (ref 7–14)
AST SERPL-CCNC: 19 U/L — SIGNIFICANT CHANGE UP (ref 4–32)
BASOPHILS # BLD AUTO: 0.02 K/UL — SIGNIFICANT CHANGE UP (ref 0–0.2)
BASOPHILS NFR BLD AUTO: 0.2 % — SIGNIFICANT CHANGE UP (ref 0–2)
BILIRUB SERPL-MCNC: 0.4 MG/DL — SIGNIFICANT CHANGE UP (ref 0.2–1.2)
BUN SERPL-MCNC: 3 MG/DL — LOW (ref 7–23)
CALCIUM SERPL-MCNC: 9.3 MG/DL — SIGNIFICANT CHANGE UP (ref 8.4–10.5)
CHLORIDE SERPL-SCNC: 102 MMOL/L — SIGNIFICANT CHANGE UP (ref 98–107)
CO2 SERPL-SCNC: 24 MMOL/L — SIGNIFICANT CHANGE UP (ref 22–31)
CREAT SERPL-MCNC: 0.83 MG/DL — SIGNIFICANT CHANGE UP (ref 0.5–1.3)
EGFR: 86 ML/MIN/1.73M2 — SIGNIFICANT CHANGE UP
EOSINOPHIL # BLD AUTO: 0.12 K/UL — SIGNIFICANT CHANGE UP (ref 0–0.5)
EOSINOPHIL NFR BLD AUTO: 1.2 % — SIGNIFICANT CHANGE UP (ref 0–6)
FLUAV AG NPH QL: SIGNIFICANT CHANGE UP
FLUBV AG NPH QL: SIGNIFICANT CHANGE UP
GLUCOSE SERPL-MCNC: 95 MG/DL — SIGNIFICANT CHANGE UP (ref 70–99)
HCT VFR BLD CALC: 27.5 % — LOW (ref 34.5–45)
HGB BLD-MCNC: 7.9 G/DL — LOW (ref 11.5–15.5)
IANC: 6.2 K/UL — SIGNIFICANT CHANGE UP (ref 1.8–7.4)
IMM GRANULOCYTES NFR BLD AUTO: 0.3 % — SIGNIFICANT CHANGE UP (ref 0–0.9)
LYMPHOCYTES # BLD AUTO: 2.33 K/UL — SIGNIFICANT CHANGE UP (ref 1–3.3)
LYMPHOCYTES # BLD AUTO: 23.4 % — SIGNIFICANT CHANGE UP (ref 13–44)
MCHC RBC-ENTMCNC: 17.2 PG — LOW (ref 27–34)
MCHC RBC-ENTMCNC: 28.7 GM/DL — LOW (ref 32–36)
MCV RBC AUTO: 59.9 FL — LOW (ref 80–100)
MONOCYTES # BLD AUTO: 1.24 K/UL — HIGH (ref 0–0.9)
MONOCYTES NFR BLD AUTO: 12.5 % — SIGNIFICANT CHANGE UP (ref 2–14)
NEUTROPHILS # BLD AUTO: 6.2 K/UL — SIGNIFICANT CHANGE UP (ref 1.8–7.4)
NEUTROPHILS NFR BLD AUTO: 62.4 % — SIGNIFICANT CHANGE UP (ref 43–77)
NRBC # BLD: 0 /100 WBCS — SIGNIFICANT CHANGE UP (ref 0–0)
NRBC # FLD: 0 K/UL — SIGNIFICANT CHANGE UP (ref 0–0)
PLATELET # BLD AUTO: 394 K/UL — SIGNIFICANT CHANGE UP (ref 150–400)
POTASSIUM SERPL-MCNC: 3.8 MMOL/L — SIGNIFICANT CHANGE UP (ref 3.5–5.3)
POTASSIUM SERPL-SCNC: 3.8 MMOL/L — SIGNIFICANT CHANGE UP (ref 3.5–5.3)
PROT SERPL-MCNC: 7.8 G/DL — SIGNIFICANT CHANGE UP (ref 6–8.3)
RBC # BLD: 4.59 M/UL — SIGNIFICANT CHANGE UP (ref 3.8–5.2)
RBC # FLD: 20.1 % — HIGH (ref 10.3–14.5)
RSV RNA NPH QL NAA+NON-PROBE: SIGNIFICANT CHANGE UP
SARS-COV-2 RNA SPEC QL NAA+PROBE: SIGNIFICANT CHANGE UP
SODIUM SERPL-SCNC: 138 MMOL/L — SIGNIFICANT CHANGE UP (ref 135–145)
TROPONIN T, HIGH SENSITIVITY RESULT: 6 NG/L — SIGNIFICANT CHANGE UP
TROPONIN T, HIGH SENSITIVITY RESULT: 7 NG/L — SIGNIFICANT CHANGE UP
WBC # BLD: 9.94 K/UL — SIGNIFICANT CHANGE UP (ref 3.8–10.5)
WBC # FLD AUTO: 9.94 K/UL — SIGNIFICANT CHANGE UP (ref 3.8–10.5)

## 2023-01-04 PROCEDURE — 99285 EMERGENCY DEPT VISIT HI MDM: CPT

## 2023-01-04 PROCEDURE — 71045 X-RAY EXAM CHEST 1 VIEW: CPT | Mod: 26

## 2023-01-04 NOTE — ED PROVIDER NOTE - PROGRESS NOTE DETAILS
Patient troponin has no significant change. Patient reassessed. Patient resting comfortably in no acute distress. Will dc with cardiologist follow up.    Marietta Diallo MD, PGY2

## 2023-01-04 NOTE — ED PROVIDER NOTE - NSFOLLOWUPINSTRUCTIONS_ED_ALL_ED_FT
You were seen for chest pain. Your pneumothorax is minimal and you do not have any active cardiac issues. Please follow up with your primary care doctor and cardiologist.     Return if you have ongoing chest pain or shortness of breath.     [facility]  General Internal Medicine  [address] [place] [address]  Phone: [phone]  Fax:     [facility]  Internal Medicine  [address] [place] [address]  Phone: [phone]  Fax: [phone]    [facility]  Cardiology  [address] [place] [id number]  Phone: [phone]    Chest Pain  WHAT YOU NEED TO KNOW:  Chest pain can be caused by a range of conditions, from not serious to life-threatening. Chest pain can be a symptom of a digestive problem, such as acid reflux or a stomach ulcer. An anxiety attack or a strong emotion, such as anger, can also cause chest pain. Infection, inflammation, or a fracture in the bones or cartilage in your chest can cause pain or discomfort. Sometimes chest pain or pressure is caused by poor blood flow to your heart (angina). Chest pain may also be caused by life-threatening conditions such as a heart attack or blood clot in your lungs.   DISCHARGE INSTRUCTIONS:  Call 911 if:   •You have any of the following signs of a heart attack: ?Squeezing, pressure, or pain in your chest  ?You may also have any of the following: ?Discomfort or pain in your back, neck, jaw, stomach, or arm  ?Shortness of breath  ?Nausea or vomiting  ?Lightheadedness or a sudden cold sweat  Seek care immediately if:   •You have chest discomfort that gets worse, even with medicine.  •You cough or vomit blood.   •Your bowel movements are black or bloody.   •You cannot stop vomiting, or it hurts to swallow.   Contact your healthcare provider if:   •You have questions or concerns about your condition or care.  Medicines:   •Medicines may be given to treat the cause of your chest pain. Examples include pain medicine, anxiety medicine, or medicines to increase blood flow to your heart.   •Do not take certain medicines without asking your healthcare provider first. These include NSAIDs, herbal or vitamin supplements, or hormones (estrogen or progestin).   •Take your medicine as directed. Contact your healthcare provider if you think your medicine is not helping or if you have side effects. Tell him or her if you are allergic to any medicine. Keep a list of the medicines, vitamins, and herbs you take. Include the amounts, and when and why you take them. Bring the list or the pill bottles to follow-up visits. Carry your medicine list with you in case of an emergency.  Follow up with your healthcare provider within 72 hours, or as directed: You may need to return for more tests to find the cause of your chest pain. You may be referred to a specialist, such as a cardiologist or gastroenterologist. Write down your questions so you remember to ask them during your visits.   Healthy living tips: The following are general healthy guidelines. If your chest pain is caused by a heart problem, your healthcare provider will give you specific guidelines to follow.  •Do not smoke. Nicotine and other chemicals in cigarettes and cigars can cause lung and heart damage. Ask your healthcare provider for information if you currently smoke and need help to quit. E-cigarettes or smokeless tobacco still contain nicotine. Talk to your healthcare provider before you use these products.   •Eat a variety of healthy, low-fat, low-salt foods. Healthy foods include fruits, vegetables, whole-grain breads, low-fat dairy products, beans, lean meats, and fish. Ask for more information about a heart healthy diet.  •Drink plenty of water every day. Your body is made of mostly water. Water helps your body to control temperature and blood pressure. Ask your healthcare provider how much water you should drink every day.  •Ask about activity. Your healthcare provider will tell you which activities to limit or avoid. Ask when you can drive, return to work, and have sex. Ask about the best exercise plan for you.  •Maintain a healthy weight. Ask your healthcare provider how much you should weigh. Ask him or her to help you create a weight loss plan if you are overweight.   If you have a stent:   •Carry your stent card with you at all times.   •Let all healthcare providers know that you have a stent.     [facility] Internal [facility]  General Internal Medicine  [address] [place] [address]  Phone: [phone]  Fax:     [facility]  Internal Medicine  [address] [place] [address]  Phone: [phone]  Fax: [phone]    [facility]  Cardiology  [address] [place] [address]  Phone: [phone]    Chest Pain  WHAT YOU NEED TO KNOW:  Chest pain can be caused by a range of conditions, from not serious to life-threatening. Chest pain can be a symptom of a digestive problem, such as acid reflux or a stomach ulcer. An anxiety attack or a strong emotion, such as anger, can also cause chest pain. Infection, inflammation, or a fracture in the bones or cartilage in your chest can cause pain or discomfort. Sometimes chest pain or pressure is caused by poor blood flow to your heart (angina). Chest pain may also be caused by life-threatening conditions such as a heart attack or blood clot in your lungs.   DISCHARGE INSTRUCTIONS:  Call 911 if:   •You have any of the following signs of a heart attack: ?Squeezing, pressure, or pain in your chest  ?You may also have any of the following: ?Discomfort or pain in your back, neck, jaw, stomach, or arm  ?Shortness of breath  ?Nausea or vomiting  ?Lightheadedness or a sudden cold sweat  Seek care immediately if:   •You have chest discomfort that gets worse, even with medicine.  •You cough or vomit blood.   •Your bowel movements are black or bloody.   •You cannot stop vomiting, or it hurts to swallow.   Contact your healthcare provider if:   •You have questions or concerns about your condition or care.  Medicines:   •Medicines may be given to treat the cause of your chest pain. Examples include pain medicine, anxiety medicine, or medicines to increase blood flow to your heart.   •Do not take certain medicines without asking your healthcare provider first. These include NSAIDs, herbal or vitamin supplements, or hormones (estrogen or progestin).   •Take your medicine as directed. Contact your healthcare provider if you think your medicine is not helping or if you have side effects. Tell him or her if you are allergic to any medicine. Keep a list of the medicines, vitamins, and herbs you take. Include the amounts, and when and why you take them. Bring the list or the pill bottles to follow-up visits. Carry your medicine list with you in case of an emergency.  Follow up with your healthcare provider within 72 hours, or as directed: You may need to return for more tests to find the cause of your chest pain. You may be referred to a specialist, such as a cardiologist or gastroenterologist. Write down your questions so you remember to ask them during your visits.   Healthy living tips: The following are general healthy guidelines. If your chest pain is caused by a heart problem, your healthcare provider will give you specific guidelines to follow.  •Do not smoke. Nicotine and other chemicals in cigarettes and cigars can cause lung and heart damage. Ask your healthcare provider for information if you currently smoke and need help to quit. E-cigarettes or smokeless tobacco still contain nicotine. Talk to your healthcare provider before you use these products.   •Eat a variety of healthy, low-fat, low-salt foods. Healthy foods include fruits, vegetables, whole-grain breads, low-fat dairy products, beans, lean meats, and fish. Ask for more information about a heart healthy diet.  •Drink plenty of water every day. Your body is made of mostly water. Water helps your body to control temperature and blood pressure. Ask your healthcare provider how much water you should drink every day.  •Ask about activity. Your healthcare provider will tell you which activities to limit or avoid. Ask when you can drive, return to work, and have sex. Ask about the best exercise plan for you.  •Maintain a healthy weight. Ask your healthcare provider how much you should weigh. Ask him or her to help you create a weight loss plan if you are overweight.   If you have a stent:   •Carry your stent card with you at all times.   •Let all healthcare providers know that you have a stent.

## 2023-01-04 NOTE — ED ADULT NURSE NOTE - NSFALLRSKHARMRISK_ED_ALL_ED
Physical therapy assistant.    Co-tx with PTA for increased safety for pt with functional transfers and mobility and safety for increased dizziness and head ache    Teodoro Genao  Time Calculation: 32 mins no

## 2023-01-04 NOTE — ED PROVIDER NOTE - NS ED ATTENDING STATEMENT MOD
This was a shared visit with the [facility]. I reviewed and verified the documentation and independently performed the documented:

## 2023-01-04 NOTE — ED ADULT NURSE REASSESSMENT NOTE - NS ED NURSE REASSESS COMMENT FT1
Pt resting in stretcher comfortably at this time with no apparent distress. Awaiting results and disposition. Will continue to monitor.

## 2023-01-04 NOTE — ED PROVIDER NOTE - MUSCULOSKELETAL, MLM
Spine appears normal, range of motion is not limited, no muscle or joint tenderness. no leg swelling b/l, no calf tenderness b/l, no palpable cords b/l.

## 2023-01-04 NOTE — ED ADULT NURSE NOTE - NSIMPLEMENTINTERV_GEN_ALL_ED
Implemented All Universal Safety Interventions:  Cross to call system. Call bell, personal items and telephone within reach. Instruct patient to call for assistance. Room bathroom lighting operational. Non-slip footwear when patient is off stretcher. Physically safe environment: no spills, clutter or unnecessary equipment. Stretcher in lowest position, wheels locked, appropriate side rails in place.

## 2023-01-04 NOTE — ED PROVIDER NOTE - CLINICAL SUMMARY MEDICAL DECISION MAKING FREE TEXT BOX
51 yo F, PMH of right pneumothorax s/p chest tube 12/30/22 w/ recent admission, HTN, preDM, anxiety presents to ED c/o coughing a/w chest heaviness since last night. well appearing female. afebrile. no hypoxia. no acute respiratory distress. EKG non ischemic. DDX: viral URI, Pneumothorax, pneumonia. Plan: labs, trop, CXR, COVID/Flu test and reassess.

## 2023-01-04 NOTE — ED ADULT NURSE NOTE - OBJECTIVE STATEMENT
52 year old female, received to room 12. Pt A&Ox4, ambulatory. Respirations equal and unlabored. Pt c/o SOB, cough, intermittent CP, and dizziness x1 day. Pt states she was experiencing worsening SOB over night. Pt with recent pneumothorax. Stitch noted to right side of back, incision dry and intact. No drainage noted. Pt denies current CP, palpitations, SOB, palpitations, N/V/D, numbness, tingling, any other medical complaints. On exam, neuro intact. PERRLA. Abdomen soft, nontender, nondistended. Skin dry and intact. Pt placed on cardiac monitor. NSR. 20G IV placed to LAC. Labs drawn and sent. Pending CXR. Will continue to monitor.

## 2023-01-04 NOTE — ED PROVIDER NOTE - PATIENT PORTAL LINK FT
You can access the FollowMyHealth Patient Portal offered by [facility] by registering at the following website: [email or web address]. By joining [facility]’s FollowMyHealth portal, you will also be able to view your health information using other applications (apps) compatible with our system.

## 2023-01-04 NOTE — ED ADULT TRIAGE NOTE - CHIEF COMPLAINT QUOTE
Pt brought in by EMS from home for SOB. Reports she was treated for pneumothorax at [facility] and was discharged yest. Reports difficulty breathing throughout the night. Respirations even and unlabored. endorsing cough. Denies CP. PMhx: HTN, anemia

## 2023-01-04 NOTE — ED PROVIDER NOTE - ATTENDING APP SHARED VISIT CONTRIBUTION OF CARE
53 yo female with PMH R pneumothorax s/p chest tube 12/30/22 w/ recent admission, HTN, preDM, anxiety for evaluation of persistent cough with intermittnet chest pressure sensation since last night. Asymptomatic at this time. Denies sick contacts  Gen: no acute distress, well appearing, awake, alert and oriented x 3  Cardiac: regular rate and rhythm, +S1S2  Pulm: Clear to auscultation bilaterally  Abd: soft, nontender, nondistended, no guarding  Back: neg CVA ttp, nontender spine  Extremity: no edema, no deformity, warm and well perfused, FROM all extremities    Neuro: awake, alert, oriented x 3, sensorimotor intact  Psych: normal affect  A/P Labs, imaging, medicate, admit

## 2023-01-04 NOTE — ED PROVIDER NOTE - NSFOLLOWUPCLINICS_GEN_ALL_ED_FT
[facility]  Cardiology  [address] [place] [id number]  Phone: [phone]  Fax:

## 2023-01-04 NOTE — ED PROVIDER NOTE - OBJECTIVE STATEMENT
53 yo F, PMH of right pneumothorax s/p chest tube 12/30/22 w/ recent admission, HTN, preDM, anxiety presents to ED c/o coughing a/w chest heaviness since last night. Reports dry coughing. States having midsternal chest heaviness w/ sob with her panic attack/anxiety. Denies any fever, chills, dizziness, back pain, abdominal pain, n/v/d, dysuria, weakness, numbness, leg edema, hx of DVT/PE, or any other complaints.

## 2023-01-04 NOTE — ED ADULT NURSE NOTE - CHIEF COMPLAINT QUOTE
Pt brought in by EMS from home for SOB. Reports she was treated for pneumothorax at [facility] and was discharged yest. Reports difficulty breathing throughout the night. Respirations even and unlabored. endorsing cough. Denies CP. PMhx: HTN, anemia

## 2023-02-03 ENCOUNTER — INPATIENT (INPATIENT)
Facility: HOSPITAL | Age: 53
LOS: 0 days | Discharge: ANOTHER HOSPITAL | DRG: 188 | End: 2023-02-04
Attending: THORACIC SURGERY (CARDIOTHORACIC VASCULAR SURGERY) | Admitting: THORACIC SURGERY (CARDIOTHORACIC VASCULAR SURGERY)
Payer: COMMERCIAL

## 2023-02-03 VITALS
SYSTOLIC BLOOD PRESSURE: 126 MMHG | WEIGHT: 179.9 LBS | RESPIRATION RATE: 20 BRPM | OXYGEN SATURATION: 97 % | DIASTOLIC BLOOD PRESSURE: 77 MMHG | HEIGHT: 70 IN | TEMPERATURE: 98 F | HEART RATE: 90 BPM

## 2023-02-03 DIAGNOSIS — J94.8 OTHER SPECIFIED PLEURAL CONDITIONS: ICD-10-CM

## 2023-02-03 LAB
ALBUMIN SERPL ELPH-MCNC: 4 G/DL — SIGNIFICANT CHANGE UP (ref 3.3–5)
ALP SERPL-CCNC: 83 U/L — SIGNIFICANT CHANGE UP (ref 40–120)
ALT FLD-CCNC: 8 U/L — LOW (ref 10–45)
ANION GAP SERPL CALC-SCNC: 12 MMOL/L — SIGNIFICANT CHANGE UP (ref 5–17)
ANISOCYTOSIS BLD QL: SLIGHT — SIGNIFICANT CHANGE UP
APTT BLD: 29.7 SEC — SIGNIFICANT CHANGE UP (ref 27.5–35.5)
AST SERPL-CCNC: 21 U/L — SIGNIFICANT CHANGE UP (ref 10–40)
BASE EXCESS BLDV CALC-SCNC: 0.1 MMOL/L — SIGNIFICANT CHANGE UP (ref -2–3)
BASOPHILS # BLD AUTO: 0.05 K/UL — SIGNIFICANT CHANGE UP (ref 0–0.2)
BASOPHILS NFR BLD AUTO: 0.8 % — SIGNIFICANT CHANGE UP (ref 0–2)
BILIRUB SERPL-MCNC: 0.3 MG/DL — SIGNIFICANT CHANGE UP (ref 0.2–1.2)
BLD GP AB SCN SERPL QL: NEGATIVE — SIGNIFICANT CHANGE UP
BLOOD GAS VENOUS - CREATININE: SIGNIFICANT CHANGE UP MG/DL (ref 0.5–1.3)
BUN SERPL-MCNC: 7 MG/DL — SIGNIFICANT CHANGE UP (ref 7–23)
CA-I SERPL-SCNC: 1.2 MMOL/L — SIGNIFICANT CHANGE UP (ref 1.15–1.33)
CALCIUM SERPL-MCNC: 9.5 MG/DL — SIGNIFICANT CHANGE UP (ref 8.4–10.5)
CHLORIDE BLDV-SCNC: 105 MMOL/L — SIGNIFICANT CHANGE UP (ref 96–108)
CHLORIDE SERPL-SCNC: 101 MMOL/L — SIGNIFICANT CHANGE UP (ref 96–108)
CO2 BLDV-SCNC: 27 MMOL/L — HIGH (ref 22–26)
CO2 SERPL-SCNC: 24 MMOL/L — SIGNIFICANT CHANGE UP (ref 22–31)
CREAT SERPL-MCNC: 1 MG/DL — SIGNIFICANT CHANGE UP (ref 0.5–1.3)
EGFR: 69 ML/MIN/1.73M2 — SIGNIFICANT CHANGE UP
ELLIPTOCYTES BLD QL SMEAR: SLIGHT — SIGNIFICANT CHANGE UP
EOSINOPHIL # BLD AUTO: 0.18 K/UL — SIGNIFICANT CHANGE UP (ref 0–0.5)
EOSINOPHIL NFR BLD AUTO: 2.7 % — SIGNIFICANT CHANGE UP (ref 0–6)
GAS PNL BLDV: 137 MMOL/L — SIGNIFICANT CHANGE UP (ref 136–145)
GAS PNL BLDV: SIGNIFICANT CHANGE UP
GAS PNL BLDV: SIGNIFICANT CHANGE UP
GLUCOSE BLDV-MCNC: 110 MG/DL — HIGH (ref 70–99)
GLUCOSE SERPL-MCNC: 111 MG/DL — HIGH (ref 70–99)
HCG SERPL-ACNC: <2 MIU/ML — SIGNIFICANT CHANGE UP
HCO3 BLDV-SCNC: 26 MMOL/L — SIGNIFICANT CHANGE UP (ref 22–29)
HCT VFR BLD CALC: 29.9 % — LOW (ref 34.5–45)
HCT VFR BLDA CALC: 26 % — LOW (ref 34.5–46.5)
HGB BLD CALC-MCNC: 8.5 G/DL — LOW (ref 11.7–16.1)
HGB BLD-MCNC: 8.1 G/DL — LOW (ref 11.5–15.5)
HYPOCHROMIA BLD QL: SIGNIFICANT CHANGE UP
IMM GRANULOCYTES NFR BLD AUTO: 0.3 % — SIGNIFICANT CHANGE UP (ref 0–0.9)
INR BLD: 1.31 RATIO — HIGH (ref 0.88–1.16)
LACTATE BLDV-MCNC: 2.1 MMOL/L — HIGH (ref 0.5–2)
LYMPHOCYTES # BLD AUTO: 1.77 K/UL — SIGNIFICANT CHANGE UP (ref 1–3.3)
LYMPHOCYTES # BLD AUTO: 26.8 % — SIGNIFICANT CHANGE UP (ref 13–44)
MANUAL SMEAR VERIFICATION: SIGNIFICANT CHANGE UP
MCHC RBC-ENTMCNC: 16.7 PG — LOW (ref 27–34)
MCHC RBC-ENTMCNC: 27.1 GM/DL — LOW (ref 32–36)
MCV RBC AUTO: 61.6 FL — LOW (ref 80–100)
MICROCYTES BLD QL: SIGNIFICANT CHANGE UP
MONOCYTES # BLD AUTO: 0.61 K/UL — SIGNIFICANT CHANGE UP (ref 0–0.9)
MONOCYTES NFR BLD AUTO: 9.2 % — SIGNIFICANT CHANGE UP (ref 2–14)
NEUTROPHILS # BLD AUTO: 3.97 K/UL — SIGNIFICANT CHANGE UP (ref 1.8–7.4)
NEUTROPHILS NFR BLD AUTO: 60.2 % — SIGNIFICANT CHANGE UP (ref 43–77)
NRBC # BLD: 0 /100 WBCS — SIGNIFICANT CHANGE UP (ref 0–0)
NT-PROBNP SERPL-SCNC: 99 PG/ML — SIGNIFICANT CHANGE UP (ref 0–300)
PCO2 BLDV: 47 MMHG — HIGH (ref 39–42)
PH BLDV: 7.35 — SIGNIFICANT CHANGE UP (ref 7.32–7.43)
PLAT MORPH BLD: NORMAL — SIGNIFICANT CHANGE UP
PLATELET # BLD AUTO: 685 K/UL — HIGH (ref 150–400)
PO2 BLDV: 23 MMHG — LOW (ref 25–45)
POIKILOCYTOSIS BLD QL AUTO: SLIGHT — SIGNIFICANT CHANGE UP
POTASSIUM BLDV-SCNC: 4.3 MMOL/L — SIGNIFICANT CHANGE UP (ref 3.5–5.1)
POTASSIUM SERPL-MCNC: 3.9 MMOL/L — SIGNIFICANT CHANGE UP (ref 3.5–5.3)
POTASSIUM SERPL-SCNC: 3.9 MMOL/L — SIGNIFICANT CHANGE UP (ref 3.5–5.3)
PROT SERPL-MCNC: 8.3 G/DL — SIGNIFICANT CHANGE UP (ref 6–8.3)
PROTHROM AB SERPL-ACNC: 15.2 SEC — HIGH (ref 10.5–13.4)
RBC # BLD: 4.85 M/UL — SIGNIFICANT CHANGE UP (ref 3.8–5.2)
RBC # FLD: 21.1 % — HIGH (ref 10.3–14.5)
RBC BLD AUTO: ABNORMAL
RH IG SCN BLD-IMP: POSITIVE — SIGNIFICANT CHANGE UP
SAO2 % BLDV: 27.4 % — LOW (ref 67–88)
SARS-COV-2 RNA SPEC QL NAA+PROBE: SIGNIFICANT CHANGE UP
SODIUM SERPL-SCNC: 137 MMOL/L — SIGNIFICANT CHANGE UP (ref 135–145)
TARGETS BLD QL SMEAR: SLIGHT — SIGNIFICANT CHANGE UP
WBC # BLD: 6.6 K/UL — SIGNIFICANT CHANGE UP (ref 3.8–10.5)
WBC # FLD AUTO: 6.6 K/UL — SIGNIFICANT CHANGE UP (ref 3.8–10.5)

## 2023-02-03 PROCEDURE — 71250 CT THORAX DX C-: CPT | Mod: MA

## 2023-02-03 PROCEDURE — 82565 ASSAY OF CREATININE: CPT

## 2023-02-03 PROCEDURE — 86901 BLOOD TYPING SEROLOGIC RH(D): CPT

## 2023-02-03 PROCEDURE — 82330 ASSAY OF CALCIUM: CPT

## 2023-02-03 PROCEDURE — 84132 ASSAY OF SERUM POTASSIUM: CPT

## 2023-02-03 PROCEDURE — 82947 ASSAY GLUCOSE BLOOD QUANT: CPT

## 2023-02-03 PROCEDURE — 85018 HEMOGLOBIN: CPT

## 2023-02-03 PROCEDURE — 36415 COLL VENOUS BLD VENIPUNCTURE: CPT

## 2023-02-03 PROCEDURE — 82435 ASSAY OF BLOOD CHLORIDE: CPT

## 2023-02-03 PROCEDURE — 86850 RBC ANTIBODY SCREEN: CPT

## 2023-02-03 PROCEDURE — 82803 BLOOD GASES ANY COMBINATION: CPT

## 2023-02-03 PROCEDURE — 71250 CT THORAX DX C-: CPT | Mod: 26,MA

## 2023-02-03 PROCEDURE — 85025 COMPLETE CBC W/AUTO DIFF WBC: CPT

## 2023-02-03 PROCEDURE — U0003: CPT

## 2023-02-03 PROCEDURE — 85610 PROTHROMBIN TIME: CPT

## 2023-02-03 PROCEDURE — 83605 ASSAY OF LACTIC ACID: CPT

## 2023-02-03 PROCEDURE — 71045 X-RAY EXAM CHEST 1 VIEW: CPT | Mod: 26

## 2023-02-03 PROCEDURE — 85014 HEMATOCRIT: CPT

## 2023-02-03 PROCEDURE — U0005: CPT

## 2023-02-03 PROCEDURE — 83880 ASSAY OF NATRIURETIC PEPTIDE: CPT

## 2023-02-03 PROCEDURE — 80053 COMPREHEN METABOLIC PANEL: CPT

## 2023-02-03 PROCEDURE — 99285 EMERGENCY DEPT VISIT HI MDM: CPT

## 2023-02-03 PROCEDURE — 84702 CHORIONIC GONADOTROPIN TEST: CPT

## 2023-02-03 PROCEDURE — 85730 THROMBOPLASTIN TIME PARTIAL: CPT

## 2023-02-03 PROCEDURE — 84295 ASSAY OF SERUM SODIUM: CPT

## 2023-02-03 PROCEDURE — 71045 X-RAY EXAM CHEST 1 VIEW: CPT

## 2023-02-03 PROCEDURE — 86900 BLOOD TYPING SEROLOGIC ABO: CPT

## 2023-02-03 RX ORDER — ENOXAPARIN SODIUM 100 MG/ML
40 INJECTION SUBCUTANEOUS EVERY 24 HOURS
Refills: 0 | Status: DISCONTINUED | OUTPATIENT
Start: 2023-02-03 | End: 2023-02-04

## 2023-02-03 RX ORDER — FERROUS SULFATE 325(65) MG
325 TABLET ORAL DAILY
Refills: 0 | Status: DISCONTINUED | OUTPATIENT
Start: 2023-02-03 | End: 2023-02-04

## 2023-02-03 NOTE — ED PROVIDER NOTE - ATTENDING APP SHARED VISIT CONTRIBUTION OF CARE
Attending MD Singh:   I personally have seen and examined this patient.  Physician assistant note reviewed and agree on plan of care and except where noted.  Please see my MDM for further details.

## 2023-02-03 NOTE — ED PROVIDER NOTE - PROGRESS NOTE DETAILS
Thoracic cx placed, will come eval  Pts [facility] MRN is [id number]  Christian Barajas PA-C ED Sign out, has R hydropneumothorax, nml VS/stable w/o hypoxia, awaiting CTS -- Frank Billy MD Spoke with surgery, no intervention recommended at this time. They will observe  Plan for OR tomorrow possibly for pleurodesis   Requesting admission on tele and with continuous pulse ox   Christian Barajas PA-C

## 2023-02-03 NOTE — ED ADULT NURSE NOTE - OBJECTIVE STATEMENT
51YO female with PMH of anemia via walk in presenting with complaints of sob dx fluid in lung. pt states being at a routine check up with PCP, yesterday. Informed to come to ED x-ray, showed fluid in the lungs. Pt stated being seen @[facility] a month ago for SOB and pneumothorax on right lung. pt states slight SOB when doing long walks. Pt Axox4, Lungs breath sounds diminished on right, respirations non-labored. radial pulses strong and equal bilaterally. Abdomen, non-distended. Skin warm, and dry. Pt denies cp, palpations, ha, nausea, dizziness, swelling on b/l ankle, fevers or chills. Pt placed in position of comfort. Pt educated on call bell system and provided call bell. Bed in lowest position, wheels locked, appropriate side rails raised. Pt denies needs at this time. 53YO female with PMH of anemia via walk in presenting with complaints of sob dx fluid in lung. pt states being at a routine check up with PCP, yesterday. Today was Informed to come to ED x-ray, showed fluid in the lungs. Pt stated being seen @[facility] a month ago for SOB and pneumothorax on right lung. pt states slight SOB when doing long walks. Pt Axox4, Lungs breath sounds diminished on right, respirations non-labored. radial pulses strong and equal bilaterally. Abdomen, non-distended. Skin warm, and dry. Pt denies cp, palpations, ha, nausea, dizziness, swelling on b/l ankle, fevers or chills. Pt placed in position of comfort. Pt educated on call bell system and provided call bell. Bed in lowest position, wheels locked, appropriate side rails raised. Pt denies needs at this time.

## 2023-02-03 NOTE — ED PROVIDER NOTE - CHIEF COMPLAINT
I spoke to the pt.  She states 06/20/18 is fine with her The patient is a 52y Female complaining of

## 2023-02-03 NOTE — H&P ADULT - NSHPLABSRESULTS_GEN_ALL_CORE
8.1    6.60  )-----------( 685      ( 03 Feb 2023 11:56 )             29.9     02-03    137  |  101  |  7   ----------------------------<  111<H>  3.9   |  24  |  1.00    Ca    9.5      03 Feb 2023 11:56    TPro  8.3  /  Alb  4.0  /  TBili  0.3  /  DBili  x   /  AST  21  /  ALT  8<L>  /  AlkPhos  83  02-03    LIVER FUNCTIONS - ( 03 Feb 2023 11:56 )  Alb: 4.0 g/dL / Pro: 8.3 g/dL / ALK PHOS: 83 U/L / ALT: 8 U/L / AST: 21 U/L / GGT: x           PT/INR - ( 03 Feb 2023 19:25 )   PT: 15.2 sec;   INR: 1.31 ratio         PTT - ( 03 Feb 2023 19:25 )  PTT:29.7 sec      < from: CT Chest No Cont (02.03.23 @ 16:21) >    FINDINGS:  LYMPH NODES: No lymphadenopathy.    HEART/VASCULATURE: Normal size heart. Trace pericardial effusion. The   aorta is normal in caliber.    AIRWAYS/LUNGS/PLEURA: Moderate right pneumothorax with near complete   collapse of the right middle and partial collapse of the right lower   lobe. A right upper lobe 0.4 cm nodule (3:182). The left lung is clear.   Small right pleural effusion and adjacent round atelectasis.    UPPER ABDOMEN: Unremarkable.    BONES/SOFT TISSUES: Degenerative changes. Bilateral breast soft tissue   nodules. Suggest correlation withmammography.    IMPRESSION:  Moderate right pneumothorax and small right pleural effusion.    A 0.4 cm right upper lobe nodule.    Bilateral breast nodules. Recommend correlation with mammography    Discussed with JOVON Barajas by Dr. Hayden on 2/3/2023 6:01 PM with   readback confirmation.    < end of copied text >

## 2023-02-03 NOTE — ED PROVIDER NOTE - OBJECTIVE STATEMENT
53 yo F with a PMH of anemia on iron presents as referral from PMD, Dr. Dorie Singh for "fluid in her lungs" on a CXR performed yest. Pt states she went to PMD for routine f/u, had CXR as part of her annual and was called with results. Pt only c/o chronic SOB which she attributes to her anemia. Has not been worse recently and is unchanged from baseline. Of note pt reports 1 mo ago she was seen at [facility] for acute on chronic SOB and was found to have a PTX. Had ?thoracentesis and was discharged with Pulm f/u. Denies nausea, vomiting, fever, chills, chest pain, cough, palpitations, decreased exercise tolerance, PND, orthopnea, leg pain/swelling, headache, dizziness, weakness, syncope. Nonsmoker.

## 2023-02-03 NOTE — ED PROVIDER NOTE - PHYSICAL EXAMINATION
CONSTITUTIONAL: Well appearing and in no apparent distress.  ENT: Airway patent, moist mucous membranes.   EYES: Pupils equal, round and reactive to light. EOMI. Conjunctiva normal appearing.   CARDIAC: Normal rate, regular rhythm.  Heart sounds S1, S2.    RESPIRATORY: Decreased breath sounds to R base. No tachypnea. O2 sat 99% on RA. Speaking in full sentences.   GASTROINTESTINAL: Abdomen soft, non-tender, not distended.  MUSCULOSKELETAL: Spine appears normal. No LE swelling.   NEUROLOGICAL: Alert and oriented x3, no focal deficits, no motor or sensory deficits. 5/5 muscle strength throughout.  SKIN: Skin normal color, warm, dry and intact.   PSYCHIATRIC: Normal mood and affect.

## 2023-02-03 NOTE — ED PROVIDER NOTE - CLINICAL SUMMARY MEDICAL DECISION MAKING FREE TEXT BOX
yesterday
Attending MD Singh:  51 yo F with a PMH of anemia on iron presents as referral from PMD, Dr. Dorie Singh for "fluid in her lungs" on a CXR performed yest. Pt states she went to PMD for routine f/u, had CXR as part of her annual and was called with results. Pt only c/o chronic SOB which she attributes to her anemia. 1 mo ago she was seen at [facility] for acute on chronic SOB and was found to have a PTX. Had ?thoracentesis and was discharged with Pulm f/u. On exam there are decrease breath sounds at right base.  No LE edema.  Plan: labs, EKG, Cardiac enzymes, CXR +/- a chest CT and re-eval.

## 2023-02-03 NOTE — H&P ADULT - NSHPREVIEWOFSYSTEMS_GEN_ALL_CORE
53 yo F with a PMH of anemia on iron presents as referral from PMD, Dr. Dorie Singh for "fluid in her lungs" on a CXR performed yest. Pt states she went to PMD for routine f/u, had CXR as part of her annual and was called with results. Pt only c/o chronic SOB which she attributes to her anemia. Has not been worse recently and is unchanged from baseline. Of note pt reports 1 mo ago she was seen at [facility] for acute on chronic SOB and was found to have a PTX. Had pigtail placed for a few days and was discharged with Pulm f/u. Denies nausea, vomiting, fever, chills, chest pain, cough, palpitations, decreased exercise tolerance, PND, orthopnea, leg pain/swelling, headache, dizziness, weakness, syncope. Nonsmoker. Has never had a colonoscopy. Last mammo normal.    Thoracic Surgery consulted for R sided hydro PTX.

## 2023-02-03 NOTE — CONSULT NOTE ADULT - SUBJECTIVE AND OBJECTIVE BOX
THORACIC SURGERY CONSULT NOTE  --------------------------------------------------------------------------------------------    HPI:   53 yo F with a PMH of anemia on iron presents as referral from PMD, Dr. Dorie Singh for "fluid in her lungs" on a CXR performed yest. Pt states she went to PMD for routine f/u, had CXR as part of her annual and was called with results. Pt only c/o chronic SOB which she attributes to her anemia. Has not been worse recently and is unchanged from baseline. Of note pt reports 1 mo ago she was seen at [facility] for acute on chronic SOB and was found to have a PTX. Had ?thoracentesis and was discharged with Pulm f/u. Denies nausea, vomiting, fever, chills, chest pain, cough, palpitations, decreased exercise tolerance, PND, orthopnea, leg pain/swelling, headache, dizziness, weakness, syncope. Nonsmoker.    Thoracic Surgery consulted for R sided hydro PTX.     ROS: 10-system review is otherwise negative except HPI above.      PAST MEDICAL & SURGICAL HISTORY:  Anemia        FAMILY HISTORY:  [] Family history not pertinent as reviewed with the patient and family    SOCIAL HISTORY:  n/a    ALLERGIES: No Known Allergies      HOME MEDICATIONS: n/a    CURRENT MEDICATIONS  MEDICATIONS (STANDING):   MEDICATIONS (PRN):  --------------------------------------------------------------------------------------------    Vitals:   T(C): 36.6 (02-03-23 @ 17:09), Max: 36.8 (02-03-23 @ 10:37)  HR: 81 (02-03-23 @ 17:09) (67 - 90)  BP: 129/86 (02-03-23 @ 17:09) (120/70 - 129/86)  RR: 16 (02-03-23 @ 17:09) (16 - 20)  SpO2: 94% (02-03-23 @ 17:09) (94% - 99%)  CAPILLARY BLOOD GLUCOSE          Height (cm): 177.8 (02-03 @ 10:37)  Weight (kg): 81.6 (02-03 @ 10:37)  BMI (kg/m2): 25.8 (02-03 @ 10:37)  BSA (m2): 2 (02-03 @ 10:37)    PHYSICAL EXAM:   General: NAD, Lying in bed comfortably  Neuro: A+Ox3  Resp: Good effort  Thorax: No chest wall tenderness  GI/Abd: Soft, NT/ND, no rebound/guarding, no masses palpated  Vascular: All 4 extremities warm.  --------------------------------------------------------------------------------------------    LABS  CBC (02-03 @ 11:56)                              8.1<L>                         6.60    )----------------(  685<H>     60.2  % Neutrophils, 26.8  % Lymphocytes, ANC: 3.97                                29.9<L>    BMP (02-03 @ 11:56)             137     |  101     |  7     		Ca++ --      Ca 9.5                ---------------------------------( 111<H>		Mg --                 3.9     |  24      |  1.00  			Ph --        LFTs (02-03 @ 11:56)      TPro 8.3 / Alb 4.0 / TBili 0.3 / DBili -- / AST 21 / ALT 8<L> / AlkPhos 83          VBG (02-03 @ 11:24)     7.35 / 47<H> / 23<L> / 26 / 0.1 / 27.4<L>%     Lactate: 2.1<H>    --------------------------------------------------------------------------------------------    MICROBIOLOGY      --------------------------------------------------------------------------------------------    IMAGING  < from: Xray Chest 1 View- PORTABLE-Urgent (Xray Chest 1 View- PORTABLE-Urgent .) (02.03.23 @ 11:59) >  IMPRESSION:    Small right-sided hydropneumothorax.    Findings discussed with JOVON Llanos  by Dr. Workman on 2/3/2023 12:12 PM   with read back confirmation.    --- End of Report ---          BRUCE WORKMAN MD; Resident Radiologist  This document has been electronically signed.  MEJIA SHI MD; Attending Radiologist  This document has been electronically signed. Feb  3 2023 12:14PM    < end of copied text >      --------------------------------------------------------------------------------------------

## 2023-02-03 NOTE — CONSULT NOTE ADULT - ASSESSMENT
ASSESSMENT: Patient is a 52y old f with hx of anemia on iron presents as referral from PMD, Dr. Dorie Singh for "fluid in her lungs" on a CXR performed yest. Pt states she went to PMD for routine f/u, had CXR as part of her annual and was called with results. Pt only c/o chronic SOB which she attributes to her anemia. Has not been worse recently and is unchanged from baseline. Of note pt reports 1 mo ago she was seen at [facility] for acute on chronic SOB and was found to have a PTX. Had ?thoracentesis and was discharged with Pulm f/u. Thoracic Surgery consulted for R sided hydroPTX.     PLAN:    - CXR w/ R small hydroPTX   - f/u official CT read     - Plan to be discussed with Attending, Dr. Galeano    Thoracic Surgery  [id number]

## 2023-02-03 NOTE — H&P ADULT - NSHPPHYSICALEXAM_GEN_ALL_CORE
Vitals:   T(C): 36.6 (02-03-23 @ 17:09), Max: 36.8 (02-03-23 @ 10:37)  HR: 81 (02-03-23 @ 17:09) (67 - 90)  BP: 129/86 (02-03-23 @ 17:09) (120/70 - 129/86)  RR: 16 (02-03-23 @ 17:09) (16 - 20)  SpO2: 94% (02-03-23 @ 17:09) (94% - 99%)  CAPILLARY BLOOD GLUCOSE          Height (cm): 177.8 (02-03 @ 10:37)  Weight (kg): 81.6 (02-03 @ 10:37)  BMI (kg/m2): 25.8 (02-03 @ 10:37)  BSA (m2): 2 (02-03 @ 10:37)    PHYSICAL EXAM:   General: NAD, Lying in bed comfortably  Neuro: A+Ox3  Resp: Good effort  Thorax: No chest wall tenderness  GI/Abd: Soft, NT/ND, no rebound/guarding, no masses palpated  Vascular: All 4 extremities warm.

## 2023-02-03 NOTE — H&P ADULT - ASSESSMENT
52F hx iron deficiency anemia, p/w recurrent spontaneous Rt hydropneumothorax found incidentally on OP CXR, currently asymptomatic    -Admit to surgery, Dr. Galeano  -telemetry  -continuous pulse ox, supplemental O2 as needed  -serial CXR  -plan for potential VATs pending clinical course    Discussed with Dr. Froylan Merida PGY3  thoracic surgery  [id number]

## 2023-02-04 ENCOUNTER — INPATIENT (INPATIENT)
Facility: HOSPITAL | Age: 53
LOS: 5 days | Discharge: HOME | End: 2023-02-10
Attending: STUDENT IN AN ORGANIZED HEALTH CARE EDUCATION/TRAINING PROGRAM | Admitting: STUDENT IN AN ORGANIZED HEALTH CARE EDUCATION/TRAINING PROGRAM
Payer: COMMERCIAL

## 2023-02-04 VITALS
RESPIRATION RATE: 17 BRPM | SYSTOLIC BLOOD PRESSURE: 108 MMHG | OXYGEN SATURATION: 95 % | HEART RATE: 83 BPM | DIASTOLIC BLOOD PRESSURE: 94 MMHG | TEMPERATURE: 98 F

## 2023-02-04 VITALS
WEIGHT: 180.12 LBS | HEIGHT: 70 IN | SYSTOLIC BLOOD PRESSURE: 135 MMHG | RESPIRATION RATE: 19 BRPM | HEART RATE: 88 BPM | TEMPERATURE: 99 F | OXYGEN SATURATION: 99 % | DIASTOLIC BLOOD PRESSURE: 82 MMHG

## 2023-02-04 DIAGNOSIS — J93.9 PNEUMOTHORAX, UNSPECIFIED: ICD-10-CM

## 2023-02-04 LAB
ANION GAP SERPL CALC-SCNC: 13 MMOL/L — SIGNIFICANT CHANGE UP (ref 7–14)
BLD GP AB SCN SERPL QL: NEGATIVE — SIGNIFICANT CHANGE UP
BUN SERPL-MCNC: 4 MG/DL — LOW (ref 7–23)
CALCIUM SERPL-MCNC: 9.7 MG/DL — SIGNIFICANT CHANGE UP (ref 8.4–10.5)
CHLORIDE SERPL-SCNC: 103 MMOL/L — SIGNIFICANT CHANGE UP (ref 98–107)
CO2 SERPL-SCNC: 23 MMOL/L — SIGNIFICANT CHANGE UP (ref 22–31)
CREAT SERPL-MCNC: 0.82 MG/DL — SIGNIFICANT CHANGE UP (ref 0.5–1.3)
EGFR: 87 ML/MIN/1.73M2 — SIGNIFICANT CHANGE UP
GLUCOSE BLDC GLUCOMTR-MCNC: 93 MG/DL — SIGNIFICANT CHANGE UP (ref 70–99)
GLUCOSE SERPL-MCNC: 94 MG/DL — SIGNIFICANT CHANGE UP (ref 70–99)
HCT VFR BLD CALC: 29.5 % — LOW (ref 34.5–45)
HGB BLD-MCNC: 8.2 G/DL — LOW (ref 11.5–15.5)
MAGNESIUM SERPL-MCNC: 2.3 MG/DL — SIGNIFICANT CHANGE UP (ref 1.6–2.6)
MCHC RBC-ENTMCNC: 16.9 PG — LOW (ref 27–34)
MCHC RBC-ENTMCNC: 27.8 GM/DL — LOW (ref 32–36)
MCV RBC AUTO: 60.8 FL — LOW (ref 80–100)
NRBC # BLD: 0 /100 WBCS — SIGNIFICANT CHANGE UP (ref 0–0)
NRBC # FLD: 0 K/UL — SIGNIFICANT CHANGE UP (ref 0–0)
PHOSPHATE SERPL-MCNC: 3.8 MG/DL — SIGNIFICANT CHANGE UP (ref 2.5–4.5)
PLATELET # BLD AUTO: 701 K/UL — HIGH (ref 150–400)
POTASSIUM SERPL-MCNC: 3.8 MMOL/L — SIGNIFICANT CHANGE UP (ref 3.5–5.3)
POTASSIUM SERPL-SCNC: 3.8 MMOL/L — SIGNIFICANT CHANGE UP (ref 3.5–5.3)
RBC # BLD: 4.85 M/UL — SIGNIFICANT CHANGE UP (ref 3.8–5.2)
RBC # FLD: 21.5 % — HIGH (ref 10.3–14.5)
RH IG SCN BLD-IMP: POSITIVE — SIGNIFICANT CHANGE UP
SODIUM SERPL-SCNC: 139 MMOL/L — SIGNIFICANT CHANGE UP (ref 135–145)
WBC # BLD: 7.24 K/UL — SIGNIFICANT CHANGE UP (ref 3.8–10.5)
WBC # FLD AUTO: 7.24 K/UL — SIGNIFICANT CHANGE UP (ref 3.8–10.5)

## 2023-02-04 PROCEDURE — 99254 IP/OBS CNSLTJ NEW/EST MOD 60: CPT | Mod: 57

## 2023-02-04 PROCEDURE — 71045 X-RAY EXAM CHEST 1 VIEW: CPT | Mod: 26

## 2023-02-04 RX ORDER — INFLUENZA VIRUS VACCINE 15; 15; 15; 15 UG/.5ML; UG/.5ML; UG/.5ML; UG/.5ML
0.5 SUSPENSION INTRAMUSCULAR ONCE
Refills: 0 | Status: DISCONTINUED | OUTPATIENT
Start: 2023-02-04 | End: 2023-02-10

## 2023-02-04 RX ORDER — HEPARIN SODIUM 5000 [USP'U]/ML
5000 INJECTION INTRAVENOUS; SUBCUTANEOUS EVERY 12 HOURS
Refills: 0 | Status: DISCONTINUED | OUTPATIENT
Start: 2023-02-04 | End: 2023-02-10

## 2023-02-04 RX ORDER — POLYETHYLENE GLYCOL 3350 17 G/17G
17 POWDER, FOR SOLUTION ORAL DAILY
Refills: 0 | Status: DISCONTINUED | OUTPATIENT
Start: 2023-02-04 | End: 2023-02-10

## 2023-02-04 RX ORDER — POTASSIUM CHLORIDE 20 MEQ
20 PACKET (EA) ORAL ONCE
Refills: 0 | Status: COMPLETED | OUTPATIENT
Start: 2023-02-04 | End: 2023-02-04

## 2023-02-04 RX ORDER — SENNA PLUS 8.6 MG/1
2 TABLET ORAL AT BEDTIME
Refills: 0 | Status: DISCONTINUED | OUTPATIENT
Start: 2023-02-04 | End: 2023-02-10

## 2023-02-04 RX ORDER — FERROUS SULFATE 325(65) MG
325 TABLET ORAL DAILY
Refills: 0 | Status: DISCONTINUED | OUTPATIENT
Start: 2023-02-04 | End: 2023-02-10

## 2023-02-04 RX ADMIN — HEPARIN SODIUM 5000 UNIT(S): 5000 INJECTION INTRAVENOUS; SUBCUTANEOUS at 18:45

## 2023-02-04 RX ADMIN — Medication 325 MILLIGRAM(S): at 12:14

## 2023-02-04 RX ADMIN — Medication 20 MILLIEQUIVALENT(S): at 18:45

## 2023-02-04 RX ADMIN — HEPARIN SODIUM 5000 UNIT(S): 5000 INJECTION INTRAVENOUS; SUBCUTANEOUS at 06:06

## 2023-02-04 NOTE — PATIENT PROFILE ADULT - FALL HARM RISK - RISK INTERVENTIONS
Assistance OOB with selected safe patient handling equipment/Assistance with ambulation/Communicate Fall Risk and Risk Factors to all staff, patient, and family/Monitor gait and stability/Reinforce activity limits and safety measures with patient and family/Sit up slowly, dangle for a short time, stand at bedside before walking/Use of alarms - bed, chair and/or voice tab/Visual Cue: Yellow wristband/Bed in lowest position, wheels locked, appropriate side rails in place/Call bell, personal items and telephone in reach/Instruct patient to call for assistance before getting out of bed or chair/Non-slip footwear when patient is out of bed/Jenners to call system/Physically safe environment - no spills, clutter or unnecessary equipment/Purposeful Proactive Rounding/Room/bathroom lighting operational, light cord in reach Assistance OOB with selected safe patient handling equipment/Communicate Fall Risk and Risk Factors to all staff, patient, and family/Discuss with provider need for PT consult/Monitor gait and stability/Provide patient with walking aids - walker, cane, crutches/Reinforce activity limits and safety measures with patient and family/Visual Cue: Yellow wristband/Bed in lowest position, wheels locked, appropriate side rails in place/Call bell, personal items and telephone in reach/Instruct patient to call for assistance before getting out of bed or chair/Non-slip footwear when patient is out of bed/[place]ley to call system/Physically safe environment - no spills, clutter or unnecessary equipment/Purposeful Proactive Rounding/Room/bathroom lighting operational, light cord in reach

## 2023-02-04 NOTE — H&P ADULT - NSHPLABSRESULTS_GEN_ALL_CORE
Labs pending     < from: CT Chest No Cont (02.03.23 @ 16:21) >    FINDINGS:  LYMPH NODES: No lymphadenopathy.    HEART/VASCULATURE: Normal size heart. Trace pericardial effusion. The   aorta is normal in caliber.    AIRWAYS/LUNGS/PLEURA: Moderate right pneumothorax with near complete   collapse of the right middle and partial collapse of the right lower   lobe. A right upper lobe 0.4 cm nodule (3:182). The left lung is clear.   Small right pleural effusion and adjacent round atelectasis.    UPPER ABDOMEN: Unremarkable.    BONES/SOFT TISSUES: Degenerative changes. Bilateral breast soft tissue   nodules. Suggest correlation with mammography.    IMPRESSION:  Moderate right pneumothorax and small right pleural effusion.    A 0.4 cm right upper lobe nodule.    Bilateral breast nodules. Recommend correlation with mammography

## 2023-02-04 NOTE — H&P ADULT - NSHPPHYSICALEXAM_GEN_ALL_CORE
Vital Signs Last 24 Hrs  T(C): 37.3 (04 Feb 2023 03:50), Max: 37.3 (04 Feb 2023 03:50)  T(F): 99.1 (04 Feb 2023 03:50), Max: 99.1 (04 Feb 2023 03:50)  HR: 88 (04 Feb 2023 03:50) (88 - 88)  BP: 135/82 (04 Feb 2023 03:50) (135/82 - 135/82)  BP(mean): --  RR: 19 (04 Feb 2023 03:50) (19 - 19)  SpO2: 99% (04 Feb 2023 03:50) (99% - 99%)    Parameters below as of 04 Feb 2023 03:50  Patient On (Oxygen Delivery Method): room air    General: WN/WD NAD  Neurology: A&Ox3, nonfocal, YANEZ x 4  Eyes: PERRLA/ EOMI, Gross vision intact  ENT/Neck: Neck supple, trachea midline, No JVD, Gross hearing intact  Respiratory: CTA B/L, No wheezing, rales, rhonchi  CV: RRR, S1S2, no murmurs, rubs or gallops  Abdominal: Soft, NT, ND +BS,   Extremities: No edema, + peripheral pulses  Skin: No Rashes, Hematoma, Ecchymosis

## 2023-02-04 NOTE — H&P ADULT - NSHPREVIEWOFSYSTEMS_GEN_ALL_CORE
REVIEW OF SYSTEMS      General:No Weight change/ Fatigue/ HA/Dizzy	    Skin/Breast: No Rashes/ Lesions/ Masses  	  Ophthalmologic: No Blurry vision/ Glaucoma/ Blindness  	  ENMT: No Hearing loss/ Drainage/ Lesions	    Respiratory and Thorax: +SOB, No Cough/ Wheezing/ Hemoptysis/ Sputum production  	  Cardiovascular: No Chest pain/ Palpitations/ Diaphoresis	    Gastrointestinal: No Nausea/ Vomiting/ Constipation/ Appetite Change	    Genitourinary: No Heamturia/ Dysuria/ Frequency change/ Impotence	    Musculoskeletal: No Pain/ Weakness/ Claudication	    Neurological: No Seizures/ TIA/CVA/ Parastesias	    Psychiatric: No Dementia/ Depression/ SI/HI	    Hematology/Lymphatics: No hx of bleeding/ Edema	    Endocrine:	No Hyperglycemia/ Hypoglycemia    Allergic/Immunologic:	 No Anaphylaxis/ Intolerance/ Recent illnesses

## 2023-02-04 NOTE — H&P ADULT - NS ATTEND AMEND GEN_ALL_CORE FT
Patient seen and examined agree with above note as modified, where appropriate, by me. Right hydroptx will need operative repair.

## 2023-02-04 NOTE — H&P ADULT - ASSESSMENT
52F hx iron deficiency anemia, p/w recurrent spontaneous Rt hydropneumothorax found incidentally on OP CXR, currently asymptomatic    Plan:   -Admit to Thoracic Surgery- Dr. Burton   Neuro: Pain management  Pulm: Encourage coughing, deep breathing and use of incentive spirometry. Wean off supplemental oxygen as able. Daily CXR.   Cardio: Monitor telemetry/alarms  GI: Tolerating diet. Continue stool softeners.  Renal: monitor urine output, supplement electrolytes as needed  Vasc: Heparin SC/SCDs for DVT prophylaxis  Heme: Stable H/H. .   ID: Off antibiotics. Stable.  Plan for potential VATs

## 2023-02-04 NOTE — H&P ADULT - HISTORY OF PRESENT ILLNESS
53 yo F with a PMH of anemia on iron presents as referral from PMD, Dr. Dorie Singh for "fluid in her lungs" on a CXR performed yest. Pt states she went to PMD for routine f/u, had CXR as part of her annual and was called with results. Pt only c/o chronic SOB which she attributes to her anemia. Has not been worse recently and is unchanged from baseline. Of note pt reports 1 mo ago she was seen at [facility] for acute on chronic SOB and was found to have a PTX. Had pigtail placed for a few days and was discharged with Pulm f/u. Denies nausea, vomiting, fever, chills, chest pain, cough, palpitations, decreased exercise tolerance, PND, orthopnea, leg pain/swelling, headache, dizziness, weakness, syncope. Nonsmoker. Has never had a colonoscopy. Last mammo normal.     53 yo F with a PMH of anemia on iron presents as referral from PMD, Dr. Dorie Singh for "fluid in her lungs" on a CXR performed yest. Pt states she went to PMD for routine f/u, had CXR as part of her annual and was called with results. Pt only c/o chronic SOB which she attributes to her anemia. Has not been worse recently and is unchanged from baseline. Of note pt reports 1 mo ago she was seen at [facility] for acute on chronic SOB and was found to have a PTX. Had pigtail placed for a few days and was discharged with Pulm f/u. Denies nausea, vomiting, fever, chills, chest pain, cough, palpitations, decreased exercise tolerance, PND, orthopnea, leg pain/swelling, headache, dizziness, weakness, syncope. Nonsmoker. Has never had a colonoscopy. Last mammo normal.  Patient with Ct chest showing moderate right pneumothorax and small right pleural effusion. Patient transferred to [facility] for further management

## 2023-02-05 ENCOUNTER — TRANSCRIPTION ENCOUNTER (OUTPATIENT)
Age: 53
End: 2023-02-05

## 2023-02-05 LAB
ANION GAP SERPL CALC-SCNC: 9 MMOL/L — SIGNIFICANT CHANGE UP (ref 7–14)
APTT BLD: 27.7 SEC — SIGNIFICANT CHANGE UP (ref 27–36.3)
BUN SERPL-MCNC: 6 MG/DL — LOW (ref 7–23)
CALCIUM SERPL-MCNC: 9.4 MG/DL — SIGNIFICANT CHANGE UP (ref 8.4–10.5)
CHLORIDE SERPL-SCNC: 104 MMOL/L — SIGNIFICANT CHANGE UP (ref 98–107)
CO2 SERPL-SCNC: 24 MMOL/L — SIGNIFICANT CHANGE UP (ref 22–31)
CREAT SERPL-MCNC: 0.91 MG/DL — SIGNIFICANT CHANGE UP (ref 0.5–1.3)
EGFR: 77 ML/MIN/1.73M2 — SIGNIFICANT CHANGE UP
GLUCOSE SERPL-MCNC: 100 MG/DL — HIGH (ref 70–99)
HCG SERPL-ACNC: <5 MIU/ML — SIGNIFICANT CHANGE UP
HCG UR QL: NEGATIVE — SIGNIFICANT CHANGE UP
HCT VFR BLD CALC: 27.3 % — LOW (ref 34.5–45)
HGB BLD-MCNC: 7.8 G/DL — LOW (ref 11.5–15.5)
INR BLD: 1.26 RATIO — HIGH (ref 0.88–1.16)
MAGNESIUM SERPL-MCNC: 2.3 MG/DL — SIGNIFICANT CHANGE UP (ref 1.6–2.6)
MCHC RBC-ENTMCNC: 17.2 PG — LOW (ref 27–34)
MCHC RBC-ENTMCNC: 28.6 GM/DL — LOW (ref 32–36)
MCV RBC AUTO: 60.3 FL — LOW (ref 80–100)
NRBC # BLD: 0 /100 WBCS — SIGNIFICANT CHANGE UP (ref 0–0)
NRBC # FLD: 0 K/UL — SIGNIFICANT CHANGE UP (ref 0–0)
PHOSPHATE SERPL-MCNC: 4.9 MG/DL — HIGH (ref 2.5–4.5)
PLATELET # BLD AUTO: 674 K/UL — HIGH (ref 150–400)
POTASSIUM SERPL-MCNC: 3.9 MMOL/L — SIGNIFICANT CHANGE UP (ref 3.5–5.3)
POTASSIUM SERPL-SCNC: 3.9 MMOL/L — SIGNIFICANT CHANGE UP (ref 3.5–5.3)
PROTHROM AB SERPL-ACNC: 14.7 SEC — HIGH (ref 10.5–13.4)
RBC # BLD: 4.53 M/UL — SIGNIFICANT CHANGE UP (ref 3.8–5.2)
RBC # FLD: 21 % — HIGH (ref 10.3–14.5)
SARS-COV-2 RNA SPEC QL NAA+PROBE: SIGNIFICANT CHANGE UP
SODIUM SERPL-SCNC: 137 MMOL/L — SIGNIFICANT CHANGE UP (ref 135–145)
WBC # BLD: 6.45 K/UL — SIGNIFICANT CHANGE UP (ref 3.8–10.5)
WBC # FLD AUTO: 6.45 K/UL — SIGNIFICANT CHANGE UP (ref 3.8–10.5)

## 2023-02-05 PROCEDURE — 71045 X-RAY EXAM CHEST 1 VIEW: CPT | Mod: 26

## 2023-02-05 PROCEDURE — 99232 SBSQ HOSP IP/OBS MODERATE 35: CPT | Mod: 57

## 2023-02-05 PROCEDURE — 99231 SBSQ HOSP IP/OBS SF/LOW 25: CPT

## 2023-02-05 RX ORDER — POTASSIUM CHLORIDE 20 MEQ
20 PACKET (EA) ORAL ONCE
Refills: 0 | Status: COMPLETED | OUTPATIENT
Start: 2023-02-05 | End: 2023-02-05

## 2023-02-05 RX ADMIN — Medication 325 MILLIGRAM(S): at 11:39

## 2023-02-05 RX ADMIN — HEPARIN SODIUM 5000 UNIT(S): 5000 INJECTION INTRAVENOUS; SUBCUTANEOUS at 05:36

## 2023-02-05 RX ADMIN — Medication 20 MILLIEQUIVALENT(S): at 11:39

## 2023-02-05 RX ADMIN — HEPARIN SODIUM 5000 UNIT(S): 5000 INJECTION INTRAVENOUS; SUBCUTANEOUS at 18:29

## 2023-02-05 NOTE — PROGRESS NOTE ADULT - SUBJECTIVE AND OBJECTIVE BOX
Thoracic Surgery Progress Note    SUBJECTIVE: Patient seen and examined at bedside with surgical team, patient without complaints. Pt resting and breathing comfortably. Pt in agreement for OR tomorrow. Pt did not endorse chest pain, SOB, fevers/chills, N/V    Vital Signs Last 24 Hrs  T(C): 36.8 (05 Feb 2023 12:00), Max: 37.5 (04 Feb 2023 13:16)  T(F): 98.3 (05 Feb 2023 12:00), Max: 99.5 (04 Feb 2023 13:16)  HR: 74 (05 Feb 2023 12:00) (65 - 93)  BP: 135/62 (05 Feb 2023 12:00) (104/57 - 135/62)  BP(mean): --  RR: 18 (05 Feb 2023 12:00) (16 - 18)  SpO2: 99% (05 Feb 2023 12:00) (94% - 100%)    Parameters below as of 05 Feb 2023 12:00  Patient On (Oxygen Delivery Method): room air    I&O's Detail    04 Feb 2023 07:01  -  05 Feb 2023 07:00  --------------------------------------------------------  IN:    Oral Fluid: 880 mL  Total IN: 880 mL    OUT:    Voided (mL): 300 mL  Total OUT: 300 mL    Total NET: 580 mL        Medications  MEDICATIONS  (STANDING):  ferrous    sulfate 325 milliGRAM(s) Oral daily  heparin   Injectable 5000 Unit(s) SubCutaneous every 12 hours  influenza   Vaccine 0.5 milliLiter(s) IntraMuscular once  polyethylene glycol 3350 17 Gram(s) Oral daily    MEDICATIONS  (PRN):  senna 2 Tablet(s) Oral at bedtime PRN Constipation      Physical Exam  Constitutional: A&Ox3, NAD  Respiratory: Breathing comfortably on RA, symmetrical chest rise. No resp accessory muscle use, no sign of resp distress  Cardiac: S1, S2. RRR.  Gastrointestinal: Soft nontender, nondistended  Extremities: Moving all extremities, no edema  Skin: No Rashes, Hematoma, Ecchymosis    LABS:                        7.8    6.45  )-----------( 674      ( 05 Feb 2023 06:15 )             27.3     02-05    137  |  104  |  6<L>  ----------------------------<  100<H>  3.9   |  24  |  0.91    Ca    9.4      05 Feb 2023 06:15  Phos  4.9     02-05  Mg     2.30     02-05      PT/INR - ( 05 Feb 2023 06:15 )   PT: 14.7 sec;   INR: 1.26 ratio         PTT - ( 05 Feb 2023 06:15 )  PTT:27.7 sec

## 2023-02-05 NOTE — PROGRESS NOTE ADULT - ASSESSMENT
Assessment: 52F hx iron deficiency anemia, p/w recurrent spontaneous Rt hydropneumothorax found incidentally on OP CXR, currently asymptomatic    Plan  - Preop for OR tomorrow  - Monitor resp status, daily CXR  - Pain control as needed   - Diet: Reg, NPO at midnight  - Cont ferrous sulfate for anemia  - DVT PPx: Hep Subq  - OOB/AMBU    Thoracic Surgery  [id number]

## 2023-02-06 ENCOUNTER — RESULT REVIEW (OUTPATIENT)
Age: 53
End: 2023-02-06

## 2023-02-06 ENCOUNTER — TRANSCRIPTION ENCOUNTER (OUTPATIENT)
Age: 53
End: 2023-02-06

## 2023-02-06 LAB
ANION GAP SERPL CALC-SCNC: 8 MMOL/L — SIGNIFICANT CHANGE UP (ref 7–14)
APTT BLD: 26.6 SEC — LOW (ref 27–36.3)
BLD GP AB SCN SERPL QL: NEGATIVE — SIGNIFICANT CHANGE UP
BUN SERPL-MCNC: 6 MG/DL — LOW (ref 7–23)
CALCIUM SERPL-MCNC: 9.3 MG/DL — SIGNIFICANT CHANGE UP (ref 8.4–10.5)
CHLORIDE SERPL-SCNC: 105 MMOL/L — SIGNIFICANT CHANGE UP (ref 98–107)
CO2 SERPL-SCNC: 25 MMOL/L — SIGNIFICANT CHANGE UP (ref 22–31)
CREAT SERPL-MCNC: 0.87 MG/DL — SIGNIFICANT CHANGE UP (ref 0.5–1.3)
EGFR: 81 ML/MIN/1.73M2 — SIGNIFICANT CHANGE UP
GLUCOSE SERPL-MCNC: 108 MG/DL — HIGH (ref 70–99)
GRAM STN FLD: SIGNIFICANT CHANGE UP
HCT VFR BLD CALC: 28.1 % — LOW (ref 34.5–45)
HGB BLD-MCNC: 8 G/DL — LOW (ref 11.5–15.5)
INR BLD: 1.3 RATIO — HIGH (ref 0.88–1.16)
MAGNESIUM SERPL-MCNC: 2.2 MG/DL — SIGNIFICANT CHANGE UP (ref 1.6–2.6)
MCHC RBC-ENTMCNC: 17.1 PG — LOW (ref 27–34)
MCHC RBC-ENTMCNC: 28.5 GM/DL — LOW (ref 32–36)
MCV RBC AUTO: 60.2 FL — LOW (ref 80–100)
NRBC # BLD: 0 /100 WBCS — SIGNIFICANT CHANGE UP (ref 0–0)
NRBC # FLD: 0 K/UL — SIGNIFICANT CHANGE UP (ref 0–0)
PHOSPHATE SERPL-MCNC: 4.5 MG/DL — SIGNIFICANT CHANGE UP (ref 2.5–4.5)
PLATELET # BLD AUTO: 694 K/UL — HIGH (ref 150–400)
POTASSIUM SERPL-MCNC: 3.9 MMOL/L — SIGNIFICANT CHANGE UP (ref 3.5–5.3)
POTASSIUM SERPL-SCNC: 3.9 MMOL/L — SIGNIFICANT CHANGE UP (ref 3.5–5.3)
PROTHROM AB SERPL-ACNC: 15.1 SEC — HIGH (ref 10.5–13.4)
RBC # BLD: 4.67 M/UL — SIGNIFICANT CHANGE UP (ref 3.8–5.2)
RBC # FLD: 21.2 % — HIGH (ref 10.3–14.5)
RH IG SCN BLD-IMP: POSITIVE — SIGNIFICANT CHANGE UP
SODIUM SERPL-SCNC: 138 MMOL/L — SIGNIFICANT CHANGE UP (ref 135–145)
SPECIMEN SOURCE: SIGNIFICANT CHANGE UP
WBC # BLD: 6.44 K/UL — SIGNIFICANT CHANGE UP (ref 3.8–10.5)
WBC # FLD AUTO: 6.44 K/UL — SIGNIFICANT CHANGE UP (ref 3.8–10.5)

## 2023-02-06 PROCEDURE — 32653 THORACOSCOPY REMOV FB/FIBRIN: CPT | Mod: AS

## 2023-02-06 PROCEDURE — 88305 TISSUE EXAM BY PATHOLOGIST: CPT | Mod: 26

## 2023-02-06 PROCEDURE — 99233 SBSQ HOSP IP/OBS HIGH 50: CPT

## 2023-02-06 PROCEDURE — 32650 THORACOSCOPY W/PLEURODESIS: CPT

## 2023-02-06 PROCEDURE — 88112 CYTOPATH CELL ENHANCE TECH: CPT | Mod: 26

## 2023-02-06 PROCEDURE — 32652 THORACOSCOPY REM TOTL CORTEX: CPT | Mod: AS

## 2023-02-06 PROCEDURE — 32650 THORACOSCOPY W/PLEURODESIS: CPT | Mod: AS

## 2023-02-06 PROCEDURE — 32655 THORACOSCOPY RESECT BULLAE: CPT | Mod: AS,59

## 2023-02-06 PROCEDURE — 88341 IMHCHEM/IMCYTCHM EA ADD ANTB: CPT | Mod: 26

## 2023-02-06 PROCEDURE — 71045 X-RAY EXAM CHEST 1 VIEW: CPT | Mod: 26

## 2023-02-06 PROCEDURE — 88307 TISSUE EXAM BY PATHOLOGIST: CPT | Mod: 26

## 2023-02-06 PROCEDURE — 32652 THORACOSCOPY REM TOTL CORTEX: CPT

## 2023-02-06 PROCEDURE — 32655 THORACOSCOPY RESECT BULLAE: CPT | Mod: 59

## 2023-02-06 PROCEDURE — 88342 IMHCHEM/IMCYTCHM 1ST ANTB: CPT | Mod: 26

## 2023-02-06 PROCEDURE — S2900 ROBOTIC SURGICAL SYSTEM: CPT | Mod: NC

## 2023-02-06 PROCEDURE — 32653 THORACOSCOPY REMOV FB/FIBRIN: CPT | Mod: 59

## 2023-02-06 DEVICE — STAPLER COVIDIEN TRI-STAPLE 60MM PURPLE INTELLIGENT RELOAD: Type: IMPLANTABLE DEVICE | Site: RIGHT | Status: FUNCTIONAL

## 2023-02-06 DEVICE — CHEST DRAIN THORACIC ARGYLE PVC 28FR STRAIGHT: Type: IMPLANTABLE DEVICE | Site: RIGHT | Status: FUNCTIONAL

## 2023-02-06 RX ORDER — NALOXONE HYDROCHLORIDE 4 MG/.1ML
0.1 SPRAY NASAL
Refills: 0 | Status: DISCONTINUED | OUTPATIENT
Start: 2023-02-06 | End: 2023-02-10

## 2023-02-06 RX ORDER — SODIUM CHLORIDE 9 MG/ML
4 INJECTION INTRAMUSCULAR; INTRAVENOUS; SUBCUTANEOUS EVERY 6 HOURS
Refills: 0 | Status: COMPLETED | OUTPATIENT
Start: 2023-02-06 | End: 2023-02-09

## 2023-02-06 RX ORDER — DORNASE ALFA 1 MG/ML
2.5 SOLUTION RESPIRATORY (INHALATION) DAILY
Refills: 0 | Status: COMPLETED | OUTPATIENT
Start: 2023-02-06 | End: 2023-02-09

## 2023-02-06 RX ORDER — ONDANSETRON 8 MG/1
4 TABLET, FILM COATED ORAL EVERY 6 HOURS
Refills: 0 | Status: DISCONTINUED | OUTPATIENT
Start: 2023-02-06 | End: 2023-02-10

## 2023-02-06 RX ORDER — ACETAMINOPHEN 500 MG
1000 TABLET ORAL ONCE
Refills: 0 | Status: DISCONTINUED | OUTPATIENT
Start: 2023-02-06 | End: 2023-02-10

## 2023-02-06 RX ORDER — HYDROMORPHONE HYDROCHLORIDE 2 MG/ML
0.5 INJECTION INTRAMUSCULAR; INTRAVENOUS; SUBCUTANEOUS
Refills: 0 | Status: DISCONTINUED | OUTPATIENT
Start: 2023-02-06 | End: 2023-02-07

## 2023-02-06 RX ORDER — LIDOCAINE 4 G/100G
1 CREAM TOPICAL EVERY 24 HOURS
Refills: 0 | Status: DISCONTINUED | OUTPATIENT
Start: 2023-02-06 | End: 2023-02-10

## 2023-02-06 RX ORDER — ACETAMINOPHEN 500 MG
1000 TABLET ORAL ONCE
Refills: 0 | Status: COMPLETED | OUTPATIENT
Start: 2023-02-06 | End: 2023-02-06

## 2023-02-06 RX ORDER — HYDROMORPHONE HYDROCHLORIDE 2 MG/ML
30 INJECTION INTRAMUSCULAR; INTRAVENOUS; SUBCUTANEOUS
Refills: 0 | Status: DISCONTINUED | OUTPATIENT
Start: 2023-02-06 | End: 2023-02-07

## 2023-02-06 RX ADMIN — HYDROMORPHONE HYDROCHLORIDE 0.5 MILLIGRAM(S): 2 INJECTION INTRAMUSCULAR; INTRAVENOUS; SUBCUTANEOUS at 20:39

## 2023-02-06 RX ADMIN — Medication 400 MILLIGRAM(S): at 20:37

## 2023-02-06 RX ADMIN — HYDROMORPHONE HYDROCHLORIDE 30 MILLILITER(S): 2 INJECTION INTRAMUSCULAR; INTRAVENOUS; SUBCUTANEOUS at 20:38

## 2023-02-06 RX ADMIN — Medication 325 MILLIGRAM(S): at 11:07

## 2023-02-06 RX ADMIN — Medication 1000 MILLIGRAM(S): at 21:38

## 2023-02-06 NOTE — BRIEF OPERATIVE NOTE - ANTIBIOTIC PROTOCOL
After Visit Summary   5/21/2018    Agustin Nolan    MRN: [id number]           Patient Information     Date Of Birth          1945        Visit Information        Provider Department      5/21/2018 10:50 AM Julio Pinto MD [facility]        Today's Diagnoses     Right hand pain    -  1      Care Instructions    Take ibuprofen(advil, motrin) 600mg three times daily with food for two weeks    OR  Naproxen(aleve) two tabs twice daily with food for two weeks            Follow-ups after your visit        Your next 10 appointments already scheduled     May 25, 2018 11:00 AM CDT   Return Visit with Kamlesh Henry MD   [facility] [place] ([facility])    [address]  [address]  [facility] [address] [phone]              Who to contact     Please call your clinic at [phone] to:    Ask questions about your health    Make or cancel appointments    Discuss your medicines    Learn about your test results    Speak to your doctor            Additional Information About Your Visit        [employer] Information     [employer]! gives you secure access to your electronic health record. If you see a primary care provider, you can also send messages to your care team and make appointments. If you have questions, please call your primary care clinic.  If you do not have a primary care provider, please call [phone] and they will assist you.      [employer] is an electronic gateway that provides easy, online access to your medical records. With [employer] you can request a clinic appointment, read your test results, renew a prescription or communicate with your care team.     To access your existing account, please contact your [facility] or call [phone] for assistance.        Care EveryWhere ID     This is your Care EveryWhere ID. This could be used by other  "organizations to access your [facility] medical records  [id number]        Your Vitals Were     Pulse Height BMI (Body Mass Index)             80 5' 10\" (1.778 m) 26.11 kg/m2          Blood Pressure from Last 3 Encounters:   05/10/18 128/84   04/26/18 122/82   03/25/18 114/80    Weight from Last 3 Encounters:   05/21/18 182 lb (82.6 kg)   05/10/18 182 lb (82.6 kg)   04/26/18 182 lb (82.6 kg)               Primary Care Provider Office Phone # Fax #    Vicente Tomlin [phone]       [address] [place] [address]        Equal Access to Services     BRENNAN MCKENZIE : Hadnataliia fuo Sokathryn, waaxda luqadaha, qaybta kaalmada adeegyada, nik carrillo . [facility] [phone].    ATENCIÓN: Si habla español, tiene a bernstein disposición servicios gratuitos de asistencia lingüística. [facility] [phone].    We comply with applicable federal civil rights laws and [place] laws. We do not discriminate on the basis of race, color, national origin, age, disability, sex, sexual orientation, or gender identity.            Thank you!     Thank you for choosing [facility]  for your care. Our goal is always to provide you with excellent care. Hearing back from our patients is one way we can continue to improve our services. Please take a few minutes to complete the written survey that you may receive in the mail after your visit with us. Thank you!             Your Updated Medication List - Protect others around you: Learn how to safely use, store and throw away your medicines at www.disposemymeds.org.          This list is accurate as of 5/21/18 11:48 AM.  Always use your most recent med list.                   Brand Name Dispense Instructions for use Diagnosis    aspirin 81 MG tablet      Take 1 tablet by mouth daily        Bilberry (Vaccinium myrtillus) 150 MG Caps      Take 160 mg by mouth        blood glucose monitoring lancets     " 100 each    Use to test blood sugar 1 times daily    Diabetes mellitus, type 2 (H)       blood glucose monitoring test strip    MADAI CONTOUR NEXT    100 strip    Use to test blood sugar 1 times daily    Diabetes mellitus, type 2 (H)       * ciprofloxacin 500 MG tablet    CIPRO    10 tablet    Take 1 tablet (500 mg) by mouth 2 times daily    H/O traveler's diarrhea       * ciprofloxacin 500 MG tablet    CIPRO    6 tablet    Take 1 tablet (500 mg) by mouth 2 times daily    Elevated prostate specific antigen (PSA)       FLUZONE HIGH-DOSE 0.5 ML injection   Generic drug:  influenza Vac Split High-Dose      TO BE ADMINISTERED BY THE PHARMACIST.        GLUCOSAMINE CHONDROITIN COMPLX PO      Take by mouth 2 times daily        mupirocin 2 % nasal ointment    BACTROBAN NASAL    10 g    Apply small amount in each nostril nightly X 14 days    Staphylococcus infection of nose       omeprazole 20 MG CR capsule    priLOSEC    90 capsule    Take 1 capsule (20 mg) by mouth daily    Gastroesophageal reflux disease, esophagitis presence not specified       pravastatin 40 MG tablet    PRAVACHOL    90 tablet    Take 1 tablet (40 mg) by mouth daily    Hyperlipidemia LDL goal <100       vitamin D 2000 units tablet      Take 1 tablet by mouth daily        ZYRTEC ALLERGY PO           * Notice:  This list has 2 medication(s) that are the same as other medications prescribed for you. Read the directions carefully, and ask your doctor or other care provider to review them with you.       Followed protocol

## 2023-02-06 NOTE — BRIEF OPERATIVE NOTE - NSICDXBRIEFPROCEDURE_GEN_ALL_CORE_FT
PROCEDURES:  VATS, with pleurodesis 06-Feb-2023 19:04:11  Momo Rizzo  
PROCEDURES:  VATS, with pleurodesis 06-Feb-2023 19:04:11  Momo Rizzo  Plication of diaphragm 06-Feb-2023 20:18:46  Ileana Morin  Thoracoscopy, robot-assisted 06-Feb-2023 20:19:07  Ileana Morin

## 2023-02-06 NOTE — BRIEF OPERATIVE NOTE - ASSISTANT(S)
Patrick Orellana, PGY-6; Aspen Abreu, PGY-3; JOVON Cortes; JOVON Torres
Patrick Orellana PGY-6, Tony Rizzo PAC, Ileana Morin PAC

## 2023-02-06 NOTE — BRIEF OPERATIVE NOTE - NSICDXBRIEFPREOP_GEN_ALL_CORE_FT
PRE-OP DIAGNOSIS:  Pneumothorax 06-Feb-2023 19:04:22  Momo Rizzo  
PRE-OP DIAGNOSIS:  Pneumothorax 06-Feb-2023 19:04:22  Momo Rizzo

## 2023-02-06 NOTE — PROGRESS NOTE ADULT - SUBJECTIVE AND OBJECTIVE BOX
ITZ MOHAN      52y   Female   MRN-[id number]         No Known Allergies             Daily     Daily Drug Dosing Weight  Height (cm): 177.8 (04 Feb 2023 03:50)  Weight (kg): 81.7 (04 Feb 2023 03:50)  BMI (kg/m2): 25.8 (04 Feb 2023 03:50)  BSA (m2): 2 (04 Feb 2023 03:50)    HPI:  51 yo F with a PMH of anemia on iron presents as referral from PMD, Dr. Dorie Singh for "fluid in her lungs" on a CXR performed yest. Pt states she went to PMD for routine f/u, had CXR as part of her annual and was called with results. Pt only c/o chronic SOB which she attributes to her anemia. Has not been worse recently and is unchanged from baseline. Of note pt reports 1 mo ago she was seen at [facility] for acute on chronic SOB and was found to have a PTX. Had pigtail placed for a few days and was discharged with Pulm f/u. Denies nausea, vomiting, fever, chills, chest pain, cough, palpitations, decreased exercise tolerance, PND, orthopnea, leg pain/swelling, headache, dizziness, weakness, syncope. Nonsmoker. Has never had a colonoscopy. Last mammo normal.  Patient with Ct chest showing moderate right pneumothorax and small right pleural effusion. Patient transferred to [facility] for further management    (04 Feb 2023 04:15)      CHIEF COMPLAINT: Follow up in ICU  for postoperative care of patient who is s/p Flexible bronchoscopy, Robotic assisted right vats with pneumolysis, mechanical and chemical pleurodesis, right upper lobe wedge, pleural biopsy and plication of diaphragm.     Procedure: Flexible bronchoscopy, Robotic assisted right vats with pneumolysis, mechanical and chemical pleurodesis, right upper lobe wedge, pleural biopsy and plication of diaphragm 2/6/2023                       Issues:              Right pneumothorax  Right pleural effusion  Anemia  Hypertension  Prediabetes              Postop pain              Chest tube in place    Postop course:     Patient reports moderate pain at chest wall incision sites which is worse with coughing and deep breathing without associated fever or dyspnea. Pain is improved with use of PCA and  oral pain meds.         Home Medications:  ferrous sulfate 325 mg (65 mg elemental iron) oral tablet: 1 tab(s) orally once a day (30 Dec 2022 20:24)  polyethylene glycol 3350 oral powder for reconstitution: 17 gram(s) orally once a day (02 Jan 2023 18:03)  senna leaf extract oral tablet: 2 tab(s) orally once a day (at bedtime) (02 Jan 2023 18:03)    PAST MEDICAL & SURGICAL HISTORY:  Anemia    Hypertension    Prediabetes      Vital Signs Last 24 Hrs  T(C): 36.6 (06 Feb 2023 12:57), Max: 37.2 (06 Feb 2023 04:02)  T(F): 97.8 (06 Feb 2023 12:57), Max: 99 (06 Feb 2023 04:02)  HR: 81 (06 Feb 2023 15:13) (78 - 88)  BP: 130/72 (06 Feb 2023 15:13) (107/57 - 138/81)  BP(mean): --  RR: 15 (06 Feb 2023 15:13) (14 - 18)  SpO2: 99% (06 Feb 2023 15:13) (99% - 100%)    Parameters below as of 06 Feb 2023 12:57  Patient On (Oxygen Delivery Method): room air      I&O's Detail    05 Feb 2023 07:01  -  06 Feb 2023 07:00  --------------------------------------------------------  IN:    Oral Fluid: 1030 mL  Total IN: 1030 mL    OUT:    Blood Loss (mL): 0 mL    IV PiggyBack: 0 mL    Voided (mL): 1025 mL  Total OUT: 1025 mL    Total NET: 5 mL      06 Feb 2023 07:01  -  06 Feb 2023 20:35  --------------------------------------------------------  IN:  Total IN: 0 mL    OUT:    Oral Fluid: 0 mL    Voided (mL): 350 mL  Total OUT: 350 mL    Total NET: -350 mL      CAPILLARY BLOOD GLUCOSE      Home Medications:  ferrous sulfate 325 mg (65 mg elemental iron) oral tablet: 1 tab(s) orally once a day (30 Dec 2022 20:24)  polyethylene glycol 3350 oral powder for reconstitution: 17 gram(s) orally once a day (02 Jan 2023 18:03)  senna leaf extract oral tablet: 2 tab(s) orally once a day (at bedtime) (02 Jan 2023 18:03)    MEDICATIONS  (STANDING):  acetaminophen   IVPB .. 1000 milliGRAM(s) IV Intermittent once  acetaminophen   IVPB .. 1000 milliGRAM(s) IV Intermittent once  ferrous    sulfate 325 milliGRAM(s) Oral daily  heparin   Injectable 5000 Unit(s) SubCutaneous every 12 hours  HYDROmorphone PCA (1 mG/mL) 30 milliLiter(s) PCA Continuous PCA Continuous  influenza   Vaccine 0.5 milliLiter(s) IntraMuscular once  polyethylene glycol 3350 17 Gram(s) Oral daily    MEDICATIONS  (PRN):  HYDROmorphone PCA (1 mG/mL) Rescue Clinician Bolus 0.5 milliGRAM(s) IV Push every 15 minutes PRN for Pain Scale GREATER THAN 6  lidocaine   4% Patch 1 Patch Transdermal every 24 hours PRN surgical area  naloxone Injectable 0.1 milliGRAM(s) IV Push every 3 minutes PRN For ANY of the following changes in patient status:  A. RR LESS THAN 10 breaths per minute, B. Oxygen saturation LESS THAN 90%, C. Sedation score of 6  ondansetron Injectable 4 milliGRAM(s) IV Push every 6 hours PRN Nausea  senna 2 Tablet(s) Oral at bedtime PRN Constipation        Physical exam:                             General:               Pt is awake, alert,  appears to be in pain but not in distress                                                  Neuro:                  Nonfocal                             Psych:                   A&Ox3                          Cardiovascular:   S1 & S2, regular                           Respiratory:         Air entry is fair and equal on both sides, has bilateral conducted sounds                           GI:                          Soft, nondistended and nontender, Bowel sounds active                            Ext:                        No cyanosis or edema     Labs:                                                                           8.0    6.44  )-----------( 694      ( 06 Feb 2023 05:03 )             28.1             02-06    138  |  105  |  6<L>  ----------------------------<  108<H>  3.9   |  25  |  0.87    Ca    9.3      06 Feb 2023 05:03  Phos  4.5     02-06  Mg     2.20     02-06                    PT/INR - ( 06 Feb 2023 05:03 )   PT: 15.1 sec;   INR: 1.30 ratio      PTT - ( 06 Feb 2023 05:03 )  PTT:26.6 sec      CXR:        Plan:  General: 52yFemale s/p Flexible bronchoscopy, Robotic assisted right vats with pneumolysis, mechanical and chemical pleurodesis, right upper lobe wedge, pleural biopsy and plication of diaphragm 2/6/2023 , experiencing  pain with deep breathing.                             Neuro:                                         Pain control with PCA /  Tylenol PRN                            Cardiovascular:                                          Telemetry (medical test) - Reviewed by me today independently. Normal sinus rhythm.     Hx of HTN but not on any meds, monitor hemodynamic status                                         Continue hemodynamic monitoring to prevent decompensation.                            Respiratory:                                         Postop hypoxemia requiring O2 via nasal cannula probably due to postop pain - Wean nasal cannula for goal O2sat above 92%.                                              Obtain CXR / CXR is clear. Encourage incentive spirometry.                                                   Chest PT and frequent suctioning. Continue bronchodilators, Pulmozyme and inhaled 3% saline inhalations.                                                      OOB to chair & ambulate w/ assistance.                                                           Continuous pulse oximetry for support & to prevent decompensation.     Pneumothorax: s/p mechanical and chemical  pleurodesis - Chest tube to suction                                          Monitor chest tube output                                                                                                                                   GI                                         On puree diet, advance to regular diet as tolerated                                         Continue Zofran / Reglan for nausea - PRN                                         Continue bowel regimen	                                                                 Renal:                                         Continue LR  30cc/hr                                         Monitor I/Os and electrolytes                                                                                        Hem/ Onc:                                         DVT prophylaxis with SQ Heparin and SCDs                                         Monitor chest tube output &  signs of bleeding.                                          Follow CBC in AM                           Infectious disease:                                            Monitor for fever / leukocytosis.                                          All surgical incision / chest tube  sites look clean                            Endocrine                                             Prediabetic: Continue Accu-Checks with coverage                                                Pertinent clinical, laboratory, radiographic, hemodynamic, echocardiographic, respiratory data, microbiologic data and chart were reviewed and analyzed frequently throughout the course of the day and night.     Patient seen, examined and plan discussed with CT Surgeon Dr. Burton  / CTICU team during rounds.    OOB to chair and ambulate with physical therapy as tolerated.     Status discussed with patient and updated plan of care.     I have spent 40 minutes with this patient including 20 minutes of time coordinating care in the ICU.            Julio Guaman MD

## 2023-02-06 NOTE — BRIEF OPERATIVE NOTE - NSICDXBRIEFPOSTOP_GEN_ALL_CORE_FT
POST-OP DIAGNOSIS:  Pneumothorax 06-Feb-2023 19:04:31  Momo Rizzo  
POST-OP DIAGNOSIS:  Pneumothorax 06-Feb-2023 19:04:31  Momo Rizzo

## 2023-02-06 NOTE — BRIEF OPERATIVE NOTE - COMMENTS
JOVON Sunshine provided direct first assist support to the surgeon during this surgical procedure. My involvement included positioning, prepping and draping the patient prior to surgery,  robotic port placement, Robotic docking, robotic instrument insertion and removal, ensuring clear visibility and exposure for the surgeon by using instruments such as retractors, suction and sponges, retrieving specimens from the operative field, closing surgical incisions, undocking the robot, stapling tissues and vessels, and dressing wounds. As well as other tasks as directed by the surgeon.
I, JOVON Torres served as first assistant on this operation. My involvement was including but not limited to positioning, prepping and draping, robotic port placement, robot docking, robotic instrument insertion and removal, exposure for the surgeon by using instruments, retrieving specimens from the operative field, closing surgical incisions, undocking the robot and dressing wounds. As well as other tasks as directed by the surgeon.

## 2023-02-07 LAB
ANION GAP SERPL CALC-SCNC: 10 MMOL/L — SIGNIFICANT CHANGE UP (ref 7–14)
BUN SERPL-MCNC: 6 MG/DL — LOW (ref 7–23)
CALCIUM SERPL-MCNC: 9.5 MG/DL — SIGNIFICANT CHANGE UP (ref 8.4–10.5)
CHLORIDE SERPL-SCNC: 104 MMOL/L — SIGNIFICANT CHANGE UP (ref 98–107)
CO2 SERPL-SCNC: 24 MMOL/L — SIGNIFICANT CHANGE UP (ref 22–31)
CREAT SERPL-MCNC: 0.79 MG/DL — SIGNIFICANT CHANGE UP (ref 0.5–1.3)
EGFR: 91 ML/MIN/1.73M2 — SIGNIFICANT CHANGE UP
GLUCOSE SERPL-MCNC: 151 MG/DL — HIGH (ref 70–99)
HCT VFR BLD CALC: 29.7 % — LOW (ref 34.5–45)
HGB BLD-MCNC: 8.3 G/DL — LOW (ref 11.5–15.5)
MAGNESIUM SERPL-MCNC: 2.1 MG/DL — SIGNIFICANT CHANGE UP (ref 1.6–2.6)
MCHC RBC-ENTMCNC: 17 PG — LOW (ref 27–34)
MCHC RBC-ENTMCNC: 27.9 GM/DL — LOW (ref 32–36)
MCV RBC AUTO: 60.9 FL — LOW (ref 80–100)
NIGHT BLUE STAIN TISS: SIGNIFICANT CHANGE UP
NRBC # BLD: 0 /100 WBCS — SIGNIFICANT CHANGE UP (ref 0–0)
NRBC # FLD: 0 K/UL — SIGNIFICANT CHANGE UP (ref 0–0)
PHOSPHATE SERPL-MCNC: 4.3 MG/DL — SIGNIFICANT CHANGE UP (ref 2.5–4.5)
PLATELET # BLD AUTO: 624 K/UL — HIGH (ref 150–400)
POTASSIUM SERPL-MCNC: 4.6 MMOL/L — SIGNIFICANT CHANGE UP (ref 3.5–5.3)
POTASSIUM SERPL-SCNC: 4.6 MMOL/L — SIGNIFICANT CHANGE UP (ref 3.5–5.3)
RBC # BLD: 4.88 M/UL — SIGNIFICANT CHANGE UP (ref 3.8–5.2)
RBC # FLD: 21.2 % — HIGH (ref 10.3–14.5)
SODIUM SERPL-SCNC: 138 MMOL/L — SIGNIFICANT CHANGE UP (ref 135–145)
SPECIMEN SOURCE: SIGNIFICANT CHANGE UP
WBC # BLD: 14.93 K/UL — HIGH (ref 3.8–10.5)
WBC # FLD AUTO: 14.93 K/UL — HIGH (ref 3.8–10.5)

## 2023-02-07 PROCEDURE — 99233 SBSQ HOSP IP/OBS HIGH 50: CPT

## 2023-02-07 PROCEDURE — 71045 X-RAY EXAM CHEST 1 VIEW: CPT | Mod: 26

## 2023-02-07 RX ORDER — OXYCODONE HYDROCHLORIDE 5 MG/1
5 TABLET ORAL
Refills: 0 | Status: DISCONTINUED | OUTPATIENT
Start: 2023-02-07 | End: 2023-02-10

## 2023-02-07 RX ORDER — OXYCODONE HYDROCHLORIDE 5 MG/1
10 TABLET ORAL
Refills: 0 | Status: DISCONTINUED | OUTPATIENT
Start: 2023-02-07 | End: 2023-02-10

## 2023-02-07 RX ADMIN — SODIUM CHLORIDE 4 MILLILITER(S): 9 INJECTION INTRAMUSCULAR; INTRAVENOUS; SUBCUTANEOUS at 15:38

## 2023-02-07 RX ADMIN — OXYCODONE HYDROCHLORIDE 10 MILLIGRAM(S): 5 TABLET ORAL at 22:47

## 2023-02-07 RX ADMIN — OXYCODONE HYDROCHLORIDE 10 MILLIGRAM(S): 5 TABLET ORAL at 23:47

## 2023-02-07 RX ADMIN — HEPARIN SODIUM 5000 UNIT(S): 5000 INJECTION INTRAVENOUS; SUBCUTANEOUS at 18:07

## 2023-02-07 RX ADMIN — SODIUM CHLORIDE 4 MILLILITER(S): 9 INJECTION INTRAMUSCULAR; INTRAVENOUS; SUBCUTANEOUS at 03:31

## 2023-02-07 RX ADMIN — HYDROMORPHONE HYDROCHLORIDE 30 MILLILITER(S): 2 INJECTION INTRAMUSCULAR; INTRAVENOUS; SUBCUTANEOUS at 07:19

## 2023-02-07 RX ADMIN — Medication 325 MILLIGRAM(S): at 11:00

## 2023-02-07 RX ADMIN — SODIUM CHLORIDE 4 MILLILITER(S): 9 INJECTION INTRAMUSCULAR; INTRAVENOUS; SUBCUTANEOUS at 21:18

## 2023-02-07 RX ADMIN — OXYCODONE HYDROCHLORIDE 10 MILLIGRAM(S): 5 TABLET ORAL at 15:36

## 2023-02-07 RX ADMIN — OXYCODONE HYDROCHLORIDE 10 MILLIGRAM(S): 5 TABLET ORAL at 14:36

## 2023-02-07 RX ADMIN — HEPARIN SODIUM 5000 UNIT(S): 5000 INJECTION INTRAVENOUS; SUBCUTANEOUS at 05:35

## 2023-02-07 NOTE — PROGRESS NOTE ADULT - SUBJECTIVE AND OBJECTIVE BOX
Anesthesia Pain Management Service    SUBJECTIVE: Patient is doing well with IV PCA and no significant problems reported. Reports pelvic menstrual pain, improving. Denies n/v.    Pain Scale Score	At rest: ___ 	With Activity: ___ 	[X] Refer to charted pain scores    THERAPY:    [ ] IV PCA Morphine		[ ] 5 mg/mL	[ ] 1 mg/mL  [x] IV PCA Hydromorphone	[ ] 5 mg/mL	[X] 1 mg/mL  [ ] IV PCA Fentanyl		[ ] 50 micrograms/mL    Demand dose _0.2_ lockout _6_ (minutes) Continuous Rate _0_ Total: 1.0 mg used (in past 24 hrs)      MEDICATIONS  (STANDING):  acetaminophen   IVPB .. 1000 milliGRAM(s) IV Intermittent once  dornase fer Solution 2.5 milliGRAM(s) Inhalation daily  ferrous    sulfate 325 milliGRAM(s) Oral daily  heparin   Injectable 5000 Unit(s) SubCutaneous every 12 hours  HYDROmorphone PCA (1 mG/mL) 30 milliLiter(s) PCA Continuous PCA Continuous  influenza   Vaccine 0.5 milliLiter(s) IntraMuscular once  polyethylene glycol 3350 17 Gram(s) Oral daily  sodium chloride 3%  Inhalation 4 milliLiter(s) Inhalation every 6 hours    MEDICATIONS  (PRN):  HYDROmorphone PCA (1 mG/mL) Rescue Clinician Bolus 0.5 milliGRAM(s) IV Push every 15 minutes PRN for Pain Scale GREATER THAN 6  lidocaine   4% Patch 1 Patch Transdermal every 24 hours PRN surgical area  naloxone Injectable 0.1 milliGRAM(s) IV Push every 3 minutes PRN For ANY of the following changes in patient status:  A. RR LESS THAN 10 breaths per minute, B. Oxygen saturation LESS THAN 90%, C. Sedation score of 6  ondansetron Injectable 4 milliGRAM(s) IV Push every 6 hours PRN Nausea  senna 2 Tablet(s) Oral at bedtime PRN Constipation      OBJECTIVE:  Sitting upright in chair.    Sedation Score:	[X] Alert	[ ] Drowsy 	[ ] Arousable	[ ] Asleep	[ ] Unresponsive    Side Effects:	[X] None 	[ ] Nausea	[ ] Vomiting	[ ] Pruritus  		[ ] Other:    Vital Signs Last 24 Hrs  T(C): 36.7 (07 Feb 2023 08:00), Max: 36.7 (07 Feb 2023 04:00)  T(F): 98.1 (07 Feb 2023 08:00), Max: 98.1 (07 Feb 2023 08:00)  HR: 76 (07 Feb 2023 08:00) (62 - 83)  BP: 114/95 (07 Feb 2023 08:00) (108/97 - 142/76)  BP(mean): 102 (07 Feb 2023 08:00) (77 - 102)  RR: 23 (07 Feb 2023 08:00) (14 - 26)  SpO2: 94% (07 Feb 2023 08:00) (87% - 100%)    Parameters below as of 07 Feb 2023 08:00  Patient On (Oxygen Delivery Method): room air        ASSESSMENT/ PLAN    Therapy to be:	[ ] Continued   [X] Discontinued   [X] Changed to prn Analgesics    Documentation and Verification of current medications:   [X] Done	[ ] Not done, not eligible    Comments: PRN Oral/IV opioids and/or Adjuvant non-opioid medication to be ordered at this point.    Progress Note written now but Patient was seen earlier.

## 2023-02-07 NOTE — PROGRESS NOTE ADULT - SUBJECTIVE AND OBJECTIVE BOX
ANESTHESIA POSTOP CHECK    52y Female POSTOP DAY 1    Vital Signs Last 24 Hrs  T(C): 36.7 (07 Feb 2023 08:00), Max: 36.7 (07 Feb 2023 04:00)  T(F): 98.1 (07 Feb 2023 08:00), Max: 98.1 (07 Feb 2023 08:00)  HR: 76 (07 Feb 2023 08:00) (62 - 83)  BP: 114/95 (07 Feb 2023 08:00) (108/97 - 142/76)  BP(mean): 102 (07 Feb 2023 08:00) (77 - 102)  RR: 23 (07 Feb 2023 08:00) (14 - 26)  SpO2: 94% (07 Feb 2023 08:00) (87% - 100%)    Parameters below as of 07 Feb 2023 08:00  Patient On (Oxygen Delivery Method): room air      I&O's Summary    06 Feb 2023 07:01  -  07 Feb 2023 07:00  --------------------------------------------------------  IN: 0 mL / OUT: 1130 mL / NET: -1130 mL    07 Feb 2023 07:01  -  07 Feb 2023 09:59  --------------------------------------------------------  IN: 240 mL / OUT: 20 mL / NET: 220 mL        [X ] NO APPARENT ANESTHESIA COMPLICATIONS      Comments: in chair POST ANESTHESIA EVALUATION    52y Female POSTOP DAY 1      MENTAL STATUS: Patient participation [x] Awake     [  ] Arousable     [  ] Sedated    AIRWAY PATENCY: [x] Satisfactory  [  ] Other:     Vital Signs Last 24 Hrs  T(C): 36.7 (07 Feb 2023 08:00), Max: 36.7 (07 Feb 2023 04:00)  T(F): 98.1 (07 Feb 2023 08:00), Max: 98.1 (07 Feb 2023 08:00)  HR: 76 (07 Feb 2023 08:00) (62 - 83)  BP: 114/95 (07 Feb 2023 08:00) (108/97 - 142/76)  BP(mean): 102 (07 Feb 2023 08:00) (77 - 102)  RR: 23 (07 Feb 2023 08:00) (14 - 26)  SpO2: 94% (07 Feb 2023 08:00) (87% - 100%)    Parameters below as of 07 Feb 2023 08:00  Patient On (Oxygen Delivery Method): room air      I&O's Summary    06 Feb 2023 07:01  -  07 Feb 2023 07:00  --------------------------------------------------------  IN: 0 mL / OUT: 1130 mL / NET: -1130 mL    07 Feb 2023 07:01  -  07 Feb 2023 10:02  --------------------------------------------------------  IN: 240 mL / OUT: 20 mL / NET: 220 mL          NAUSEA/ VOMITTING:  [x] NONE  [  ] CONTROLLED [  ] OTHER     PAIN: [x] CONTROLLED WITH CURRENT REGIMEN  [  ] OTHER    [x] NO APPARENT ANESTHESIA COMPLICATIONS      Comments: sitting in chair

## 2023-02-07 NOTE — PROGRESS NOTE ADULT - SUBJECTIVE AND OBJECTIVE BOX
ITZ MOHAN                     [id number]    HPI:  51 yo F with a PMH of anemia on iron presents as referral from PMD, Dr. Dorie Singh for "fluid in her lungs" on a CXR performed yest. Pt states she went to PMD for routine f/u, had CXR as part of her annual and was called with results. Pt only c/o chronic SOB which she attributes to her anemia. Has not been worse recently and is unchanged from baseline. Of note pt reports 1 mo ago she was seen at [facility] for acute on chronic SOB and was found to have a PTX. Had pigtail placed for a few days and was discharged with Pulm f/u. Denies nausea, vomiting, fever, chills, chest pain, cough, palpitations, decreased exercise tolerance, PND, orthopnea, leg pain/swelling, headache, dizziness, weakness, syncope. Nonsmoker. Has never had a colonoscopy. Last mammo normal.  Patient with Ct chest showing moderate right pneumothorax and small right pleural effusion. Patient transferred to [facility] for further management    (04 Feb 2023 04:15)    CHIEF COMPLAINT: Follow up in ICU  for postoperative care of patient who is s/p Flexible bronchoscopy, Robotic assisted right vats with pneumolysis, mechanical and chemical pleurodesis, right upper lobe wedge, pleural biopsy and plication of diaphragm.     Procedure: Flexible bronchoscopy, Robotic assisted right vats with pneumolysis, mechanical and chemical pleurodesis, right upper lobe wedge, pleural biopsy and plication of diaphragm 2/6/2023                       Issues:              Right pneumothorax  Right pleural effusion  Anemia  Hypertension  Prediabetes              Postop pain              Chest tube in place      PAST MEDICAL & SURGICAL HISTORY:  Anemia      Hypertension      Prediabetes                VITAL SIGNS:  Vital Signs Last 24 Hrs  T(C): 36.7 (07 Feb 2023 08:00), Max: 36.7 (07 Feb 2023 04:00)  T(F): 98.1 (07 Feb 2023 08:00), Max: 98.1 (07 Feb 2023 08:00)  HR: 87 (07 Feb 2023 10:00) (62 - 87)  BP: 125/77 (07 Feb 2023 10:00) (108/97 - 142/76)  BP(mean): 90 (07 Feb 2023 10:00) (77 - 102)  RR: 34 (07 Feb 2023 10:00) (14 - 34)  SpO2: 96% (07 Feb 2023 10:00) (87% - 100%)    Parameters below as of 07 Feb 2023 10:00  Patient On (Oxygen Delivery Method): room air        I/Os:   I&O's Detail    06 Feb 2023 07:01  -  07 Feb 2023 07:00  --------------------------------------------------------  IN:  Total IN: 0 mL    OUT:    Chest Tube (mL): 180 mL    Oral Fluid: 0 mL    Voided (mL): 950 mL  Total OUT: 1130 mL    Total NET: -1130 mL      07 Feb 2023 07:01  -  07 Feb 2023 11:16  --------------------------------------------------------  IN:    Oral Fluid: 240 mL  Total IN: 240 mL    OUT:    Chest Tube (mL): 60 mL    Voided (mL): 300 mL  Total OUT: 360 mL    Total NET: -120 mL          CAPILLARY BLOOD GLUCOSE          =======================MEDICATIONS===================  MEDICATIONS  (STANDING):  acetaminophen   IVPB .. 1000 milliGRAM(s) IV Intermittent once  dornase fer Solution 2.5 milliGRAM(s) Inhalation daily  ferrous    sulfate 325 milliGRAM(s) Oral daily  heparin   Injectable 5000 Unit(s) SubCutaneous every 12 hours  influenza   Vaccine 0.5 milliLiter(s) IntraMuscular once  polyethylene glycol 3350 17 Gram(s) Oral daily  sodium chloride 3%  Inhalation 4 milliLiter(s) Inhalation every 6 hours    MEDICATIONS  (PRN):  lidocaine   4% Patch 1 Patch Transdermal every 24 hours PRN surgical area  naloxone Injectable 0.1 milliGRAM(s) IV Push every 3 minutes PRN For ANY of the following changes in patient status:  A. RR LESS THAN 10 breaths per minute, B. Oxygen saturation LESS THAN 90%, C. Sedation score of 6  ondansetron Injectable 4 milliGRAM(s) IV Push every 6 hours PRN Nausea  oxyCODONE    IR 5 milliGRAM(s) Oral every 3 hours PRN Moderate Pain (4 - 6)  oxyCODONE    IR 10 milliGRAM(s) Oral every 3 hours PRN Severe Pain (7 - 10)  senna 2 Tablet(s) Oral at bedtime PRN Constipation        PHYSICAL EXAM============================  General:                         Awake, alert, not in any distress  Neuro:                            Moving all extremities to commands.   Respiratory:	Air entry fair and  bilateral conducted sounds                                           Effort even and unlabored.  CV:		Regular rate and rhythm. Normal S1/S2                                          Distal pulses present.  Abdomen:	                     Soft, non-distended. Bowel sounds present   Skin:		No rash.  Extremities:	Warm, no cyanosis or edema.  Palpable pulses    ============================LABS=========================                        8.3    14.93 )-----------( 624      ( 07 Feb 2023 03:00 )             29.7     02-07    138  |  104  |  6<L>  ----------------------------<  151<H>  4.6   |  24  |  0.79    Ca    9.5      07 Feb 2023 03:00  Phos  4.3     02-07  Mg     2.10     02-07        PT/INR - ( 06 Feb 2023 05:03 )   PT: 15.1 sec;   INR: 1.30 ratio         PTT - ( 06 Feb 2023 05:03 )  PTT:26.6 sec      A/P:  52yFemale s/p Flexible bronchoscopy, Robotic assisted right vats with pneumolysis, mechanical and chemical pleurodesis, right upper lobe wedge, pleural biopsy and plication of diaphragm 2/6/2023 , experiencing  pain with deep breathing.                             Neuro:    OOBTC                                        Pain control with PCA /  Tylenol PRN                            Cardiovascular:                                          Telemetry (medical test) - Reviewed by me today independently. Normal sinus rhythm.     Hx of HTN but not on any meds, monitor hemodynamic status                                         Continue hemodynamic monitoring to prevent decompensation.                            Respiratory:                                         Postop hypoxemia requiring O2 via nasal cannula probably due to postop pain - Wean nasal cannula for goal O2sat above 92%.                                               Encourage incentive spirometry.                                                   Chest PT and frequent suctioning. Continue bronchodilators, Pulmozyme and inhaled 3% saline inhalations.                                                      OOB to chair & ambulate w/ assistance.                                                           Continuous pulse oximetry for support & to prevent decompensation.     Pneumothorax: s/p mechanical and chemical  pleurodesis - Chest tube to suction                                          Monitor chest tube output                                                                                                                                   GI                                         On regular diet as tolerated                                         Continue Zofran / Reglan for nausea - PRN                                         Continue bowel regimen	                                                                 Renal:                                         Continue LR  30cc/hr                                         Monitor I/Os and electrolytes                                                                                        Hem/ Onc:                                         DVT prophylaxis with SQ Heparin and SCDs                                         Monitor chest tube output &  signs of bleeding.                                          Follow CBC in AM                           Infectious disease:                                            Monitor for fever / leukocytosis.                                          All surgical incision / chest tube  sites look clean                            Endocrine                                             Prediabetic: Continue Accu-Checks with coverage                                                Pertinent clinical, laboratory, radiographic, hemodynamic, echocardiographic, respiratory data, microbiologic data and chart were reviewed and analyzed frequently throughout the course of the day and night.     Patient seen, examined and plan discussed with CT Surgeon Dr. Burton  / CTICU team during rounds.    OOB to chair and ambulate with physical therapy as tolerated.     Status discussed with patient and updated plan of care.     I have spent 35 minutes with this patient including 20 minutes of time coordinating care in the ICU.            Fahad VERASP

## 2023-02-08 LAB
ANION GAP SERPL CALC-SCNC: 13 MMOL/L — SIGNIFICANT CHANGE UP (ref 7–14)
BUN SERPL-MCNC: 8 MG/DL — SIGNIFICANT CHANGE UP (ref 7–23)
CALCIUM SERPL-MCNC: 9.2 MG/DL — SIGNIFICANT CHANGE UP (ref 8.4–10.5)
CHLORIDE SERPL-SCNC: 101 MMOL/L — SIGNIFICANT CHANGE UP (ref 98–107)
CO2 SERPL-SCNC: 24 MMOL/L — SIGNIFICANT CHANGE UP (ref 22–31)
CREAT SERPL-MCNC: 0.88 MG/DL — SIGNIFICANT CHANGE UP (ref 0.5–1.3)
EGFR: 80 ML/MIN/1.73M2 — SIGNIFICANT CHANGE UP
GLUCOSE SERPL-MCNC: 115 MG/DL — HIGH (ref 70–99)
HCT VFR BLD CALC: 26.7 % — LOW (ref 34.5–45)
HGB BLD-MCNC: 7.4 G/DL — LOW (ref 11.5–15.5)
MAGNESIUM SERPL-MCNC: 2 MG/DL — SIGNIFICANT CHANGE UP (ref 1.6–2.6)
MCHC RBC-ENTMCNC: 16.8 PG — LOW (ref 27–34)
MCHC RBC-ENTMCNC: 27.7 GM/DL — LOW (ref 32–36)
MCV RBC AUTO: 60.7 FL — LOW (ref 80–100)
NRBC # BLD: 0 /100 WBCS — SIGNIFICANT CHANGE UP (ref 0–0)
NRBC # FLD: 0 K/UL — SIGNIFICANT CHANGE UP (ref 0–0)
PHOSPHATE SERPL-MCNC: 3.6 MG/DL — SIGNIFICANT CHANGE UP (ref 2.5–4.5)
PLATELET # BLD AUTO: 610 K/UL — HIGH (ref 150–400)
POTASSIUM SERPL-MCNC: 4 MMOL/L — SIGNIFICANT CHANGE UP (ref 3.5–5.3)
POTASSIUM SERPL-SCNC: 4 MMOL/L — SIGNIFICANT CHANGE UP (ref 3.5–5.3)
RBC # BLD: 4.4 M/UL — SIGNIFICANT CHANGE UP (ref 3.8–5.2)
RBC # FLD: 20.9 % — HIGH (ref 10.3–14.5)
SODIUM SERPL-SCNC: 138 MMOL/L — SIGNIFICANT CHANGE UP (ref 135–145)
WBC # BLD: 11.77 K/UL — HIGH (ref 3.8–10.5)
WBC # FLD AUTO: 11.77 K/UL — HIGH (ref 3.8–10.5)

## 2023-02-08 PROCEDURE — 76830 TRANSVAGINAL US NON-OB: CPT | Mod: 26

## 2023-02-08 PROCEDURE — 71045 X-RAY EXAM CHEST 1 VIEW: CPT | Mod: 26

## 2023-02-08 RX ADMIN — SODIUM CHLORIDE 4 MILLILITER(S): 9 INJECTION INTRAMUSCULAR; INTRAVENOUS; SUBCUTANEOUS at 03:24

## 2023-02-08 RX ADMIN — OXYCODONE HYDROCHLORIDE 10 MILLIGRAM(S): 5 TABLET ORAL at 16:43

## 2023-02-08 RX ADMIN — LIDOCAINE 1 PATCH: 4 CREAM TOPICAL at 04:28

## 2023-02-08 RX ADMIN — OXYCODONE HYDROCHLORIDE 10 MILLIGRAM(S): 5 TABLET ORAL at 17:13

## 2023-02-08 RX ADMIN — DORNASE ALFA 2.5 MILLIGRAM(S): 1 SOLUTION RESPIRATORY (INHALATION) at 10:16

## 2023-02-08 RX ADMIN — LIDOCAINE 1 PATCH: 4 CREAM TOPICAL at 07:29

## 2023-02-08 RX ADMIN — POLYETHYLENE GLYCOL 3350 17 GRAM(S): 17 POWDER, FOR SOLUTION ORAL at 13:30

## 2023-02-08 RX ADMIN — Medication 325 MILLIGRAM(S): at 13:30

## 2023-02-08 RX ADMIN — SODIUM CHLORIDE 4 MILLILITER(S): 9 INJECTION INTRAMUSCULAR; INTRAVENOUS; SUBCUTANEOUS at 10:16

## 2023-02-08 RX ADMIN — HEPARIN SODIUM 5000 UNIT(S): 5000 INJECTION INTRAVENOUS; SUBCUTANEOUS at 06:00

## 2023-02-08 RX ADMIN — HEPARIN SODIUM 5000 UNIT(S): 5000 INJECTION INTRAVENOUS; SUBCUTANEOUS at 18:20

## 2023-02-08 RX ADMIN — LIDOCAINE 1 PATCH: 4 CREAM TOPICAL at 16:33

## 2023-02-08 RX ADMIN — SODIUM CHLORIDE 4 MILLILITER(S): 9 INJECTION INTRAMUSCULAR; INTRAVENOUS; SUBCUTANEOUS at 21:13

## 2023-02-08 NOTE — PROGRESS NOTE ADULT - SUBJECTIVE AND OBJECTIVE BOX
Patient seen and examined at bedside by Thoracic.  Patient seen laying in bed, on RA, in NAD.  States she has mild pain and that overnight the pain was very bad but she just took some medication. Educated on asking nurse for pain medications.  Denies acute overnight events, denies other active complaints at this time.    Vital Signs:  Vital Signs Last 24 Hrs  T(C): 36.5 (02-08-23 @ 08:00), Max: 37.3 (02-08-23 @ 05:19)  T(F): 97.7 (02-08-23 @ 08:00), Max: 99.1 (02-08-23 @ 05:19)  HR: 78 (02-08-23 @ 08:00) (71 - 87)  BP: 103/58 (02-08-23 @ 08:00) (94/54 - 130/66)  RR: 18 (02-08-23 @ 08:00) (18 - 34)  SpO2: 99% (02-08-23 @ 08:00) (96% - 100%)     Physical Exam  Constitutional: A&Ox3, NAD  Respiratory: Breathing comfortably on RA, symmetrical chest rise. No resp accessory muscle use, no sign of resp distress  Cardiac: S1, S2. RRR.  Gastrointestinal: Soft nontender, nondistended  Extremities: YANEZ x4  Skin: No Rashes, Hematoma, Ecchymosis  Incisions: c/d/i  Tubes: R CT to -20sxn, +AL, 140cc output    Relevant labs, radiology and Medications reviewed                        7.4    11.77 )-----------( 610      ( 08 Feb 2023 06:06 )             26.7     02-08    138  |  101  |  8   ----------------------------<  115<H>  4.0   |  24  |  0.88    Ca    9.2      08 Feb 2023 06:06  Phos  3.6     02-08  Mg     2.00     02-08        MEDICATIONS  (STANDING):  acetaminophen   IVPB .. 1000 milliGRAM(s) IV Intermittent once  dornase fer Solution 2.5 milliGRAM(s) Inhalation daily  ferrous    sulfate 325 milliGRAM(s) Oral daily  heparin   Injectable 5000 Unit(s) SubCutaneous every 12 hours  influenza   Vaccine 0.5 milliLiter(s) IntraMuscular once  polyethylene glycol 3350 17 Gram(s) Oral daily  sodium chloride 3%  Inhalation 4 milliLiter(s) Inhalation every 6 hours    MEDICATIONS  (PRN):  lidocaine   4% Patch 1 Patch Transdermal every 24 hours PRN surgical area  naloxone Injectable 0.1 milliGRAM(s) IV Push every 3 minutes PRN For ANY of the following changes in patient status:  A. RR LESS THAN 10 breaths per minute, B. Oxygen saturation LESS THAN 90%, C. Sedation score of 6  ondansetron Injectable 4 milliGRAM(s) IV Push every 6 hours PRN Nausea  oxyCODONE    IR 5 milliGRAM(s) Oral every 3 hours PRN Moderate Pain (4 - 6)  oxyCODONE    IR 10 milliGRAM(s) Oral every 3 hours PRN Severe Pain (7 - 10)  senna 2 Tablet(s) Oral at bedtime PRN Constipation    Pertinent Physical Exam  I&O's Summary    07 Feb 2023 07:01  -  08 Feb 2023 07:00  --------------------------------------------------------  IN: 790 mL / OUT: 1140 mL / NET: -350 mL        Assessment  52F hx iron deficiency anemia, p/w recurrent spontaneous Rt hydropneumothorax found incidentally on OP CXR. Now s/p RVATS, RUL Wedge, Pneumolysis, Mechanical and chemical pleurodesis, diaphragm plication (02/06/23).    PLAN  Neuro: Pain management  Pulm: Encourage coughing, deep breathing and use of incentive spirometry. Daily CXR.   Cardio: Monitor telemetry/alarms  GI: Tolerating diet. Continue stool softeners.  Renal: monitor urine output, supplement electrolytes as needed  : Gyn c/s today  Vasc: Heparin SC/SCDs for DVT prophylaxis  Heme: Stable H/H. .   ID: Off antibiotics. Stable.  Therapy: OOB/ambulate  Tubes: Monitor Chest tube output and air leak status. Keep to sxn.  Disposition: Aim to D/C to home once air leak resolves and chest tube removed.  Discussed with Cardiothoracic Team at AM rounds.

## 2023-02-09 LAB
ANION GAP SERPL CALC-SCNC: 10 MMOL/L — SIGNIFICANT CHANGE UP (ref 7–14)
BUN SERPL-MCNC: 8 MG/DL — SIGNIFICANT CHANGE UP (ref 7–23)
CALCIUM SERPL-MCNC: 8.9 MG/DL — SIGNIFICANT CHANGE UP (ref 8.4–10.5)
CHLORIDE SERPL-SCNC: 104 MMOL/L — SIGNIFICANT CHANGE UP (ref 98–107)
CO2 SERPL-SCNC: 25 MMOL/L — SIGNIFICANT CHANGE UP (ref 22–31)
CREAT SERPL-MCNC: 0.83 MG/DL — SIGNIFICANT CHANGE UP (ref 0.5–1.3)
EGFR: 86 ML/MIN/1.73M2 — SIGNIFICANT CHANGE UP
GLUCOSE SERPL-MCNC: 112 MG/DL — HIGH (ref 70–99)
HCT VFR BLD CALC: 25 % — LOW (ref 34.5–45)
HCT VFR BLD CALC: 30.5 % — LOW (ref 34.5–45)
HGB BLD-MCNC: 6.9 G/DL — CRITICAL LOW (ref 11.5–15.5)
HGB BLD-MCNC: 8.8 G/DL — LOW (ref 11.5–15.5)
MAGNESIUM SERPL-MCNC: 1.9 MG/DL — SIGNIFICANT CHANGE UP (ref 1.6–2.6)
MCHC RBC-ENTMCNC: 17 PG — LOW (ref 27–34)
MCHC RBC-ENTMCNC: 18.2 PG — LOW (ref 27–34)
MCHC RBC-ENTMCNC: 27.6 GM/DL — LOW (ref 32–36)
MCHC RBC-ENTMCNC: 28.9 GM/DL — LOW (ref 32–36)
MCV RBC AUTO: 61.4 FL — LOW (ref 80–100)
MCV RBC AUTO: 63.1 FL — LOW (ref 80–100)
NRBC # BLD: 0 /100 WBCS — SIGNIFICANT CHANGE UP (ref 0–0)
NRBC # BLD: 0 /100 WBCS — SIGNIFICANT CHANGE UP (ref 0–0)
NRBC # FLD: 0 K/UL — SIGNIFICANT CHANGE UP (ref 0–0)
NRBC # FLD: 0 K/UL — SIGNIFICANT CHANGE UP (ref 0–0)
PHOSPHATE SERPL-MCNC: 4.4 MG/DL — SIGNIFICANT CHANGE UP (ref 2.5–4.5)
PLATELET # BLD AUTO: 534 K/UL — HIGH (ref 150–400)
PLATELET # BLD AUTO: 587 K/UL — HIGH (ref 150–400)
POTASSIUM SERPL-MCNC: 3.9 MMOL/L — SIGNIFICANT CHANGE UP (ref 3.5–5.3)
POTASSIUM SERPL-SCNC: 3.9 MMOL/L — SIGNIFICANT CHANGE UP (ref 3.5–5.3)
RBC # BLD: 4.07 M/UL — SIGNIFICANT CHANGE UP (ref 3.8–5.2)
RBC # BLD: 4.83 M/UL — SIGNIFICANT CHANGE UP (ref 3.8–5.2)
RBC # FLD: 21.2 % — HIGH (ref 10.3–14.5)
RBC # FLD: 23.2 % — HIGH (ref 10.3–14.5)
SODIUM SERPL-SCNC: 139 MMOL/L — SIGNIFICANT CHANGE UP (ref 135–145)
SURGICAL PATHOLOGY STUDY: SIGNIFICANT CHANGE UP
WBC # BLD: 10.04 K/UL — SIGNIFICANT CHANGE UP (ref 3.8–10.5)
WBC # BLD: 8.8 K/UL — SIGNIFICANT CHANGE UP (ref 3.8–10.5)
WBC # FLD AUTO: 10.04 K/UL — SIGNIFICANT CHANGE UP (ref 3.8–10.5)
WBC # FLD AUTO: 8.8 K/UL — SIGNIFICANT CHANGE UP (ref 3.8–10.5)

## 2023-02-09 PROCEDURE — 99252 IP/OBS CONSLTJ NEW/EST SF 35: CPT | Mod: GC

## 2023-02-09 PROCEDURE — 71045 X-RAY EXAM CHEST 1 VIEW: CPT | Mod: 26,77

## 2023-02-09 PROCEDURE — 71045 X-RAY EXAM CHEST 1 VIEW: CPT | Mod: 26

## 2023-02-09 RX ORDER — FOLIC ACID 0.8 MG
1 TABLET ORAL DAILY
Refills: 0 | Status: DISCONTINUED | OUTPATIENT
Start: 2023-02-09 | End: 2023-02-10

## 2023-02-09 RX ORDER — ASCORBIC ACID 60 MG
500 TABLET,CHEWABLE ORAL DAILY
Refills: 0 | Status: DISCONTINUED | OUTPATIENT
Start: 2023-02-09 | End: 2023-02-10

## 2023-02-09 RX ADMIN — OXYCODONE HYDROCHLORIDE 10 MILLIGRAM(S): 5 TABLET ORAL at 01:31

## 2023-02-09 RX ADMIN — HEPARIN SODIUM 5000 UNIT(S): 5000 INJECTION INTRAVENOUS; SUBCUTANEOUS at 17:29

## 2023-02-09 RX ADMIN — SODIUM CHLORIDE 4 MILLILITER(S): 9 INJECTION INTRAMUSCULAR; INTRAVENOUS; SUBCUTANEOUS at 16:54

## 2023-02-09 RX ADMIN — SODIUM CHLORIDE 4 MILLILITER(S): 9 INJECTION INTRAMUSCULAR; INTRAVENOUS; SUBCUTANEOUS at 21:31

## 2023-02-09 RX ADMIN — Medication 1 MILLIGRAM(S): at 11:33

## 2023-02-09 RX ADMIN — LIDOCAINE 1 PATCH: 4 CREAM TOPICAL at 05:53

## 2023-02-09 RX ADMIN — OXYCODONE HYDROCHLORIDE 10 MILLIGRAM(S): 5 TABLET ORAL at 00:31

## 2023-02-09 RX ADMIN — POLYETHYLENE GLYCOL 3350 17 GRAM(S): 17 POWDER, FOR SOLUTION ORAL at 11:33

## 2023-02-09 RX ADMIN — LIDOCAINE 1 PATCH: 4 CREAM TOPICAL at 07:39

## 2023-02-09 RX ADMIN — Medication 500 MILLIGRAM(S): at 11:33

## 2023-02-09 RX ADMIN — Medication 325 MILLIGRAM(S): at 11:33

## 2023-02-09 RX ADMIN — HEPARIN SODIUM 5000 UNIT(S): 5000 INJECTION INTRAVENOUS; SUBCUTANEOUS at 05:54

## 2023-02-09 RX ADMIN — DORNASE ALFA 2.5 MILLIGRAM(S): 1 SOLUTION RESPIRATORY (INHALATION) at 16:53

## 2023-02-09 RX ADMIN — LIDOCAINE 1 PATCH: 4 CREAM TOPICAL at 17:32

## 2023-02-09 NOTE — CONSULT NOTE ADULT - SUBJECTIVE AND OBJECTIVE BOX
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    He is s/p tricuspid valve replacement surgery and aortic valve exploration for control of infection on 04/01 with positive gram stain and culture of tricuspid valve tissue for staph aureus.  Yeast also seen on stain but read corrected later to no yeast.  Micafungin discontinued as Micro results  updated by lab. Initial read of 1+ yeast on valve tissue on 4/1 was corrected and updated. Only staph aureus on valve tissue cx, no yeast seen on stain.    On 4/5, he suffered a ventricular tachycardia/fibrillation arrest.  ROSC achieved after one round of CPR and one shock.  Alert and neurologically intact following.   On 4/ 6 back pain again,   MRI lumbar spine done on 4/ 7 :   Interval development of en bloc abnormal T2 signal hyperintensity  and abnormal contrast enhancement involving the lower L5 vertebral  body, S1 vertebral body and right sacral ala and anterior epidural  space from mid L4 down to mid S2 worrisome for an infectious process  such as osteomyelitis with epidural extension of infection     Recommendations:  1. Last positive blood cultures on 4/3 1 blood cx + on 4/5 , follow up since then have been negative.   2. On nafcillin. LOT 5/ 17 ( OPIV antibiotics orders are in the chart) looks like plan is home if so likely switch order to 12 gr daily continuous infusion by pump  3. S/P ICD placement, PICC in  Stable looks like Mon disposition  Will Follow-up then    Eliu Eng  MD    Interval History   Resting, tolerating antibiotics ok   Afebrile     Physical Exam   Temp: 97.6  F (36.4  C) Temp src: Temporal BP: (!) 140/68 Pulse: 67   Resp: 18 SpO2: 99 % O2 Device: None (Room air)    Vitals:    04/13/22 0557 04/14/22 0648 04/15/22 0554   Weight: 115.7 kg (255 lb 1.6 oz) 116.7 kg (257 lb 3.2 oz) 117 kg (258 lb)     Vital Signs with Ranges  Temp:  [97.6  F (36.4  C)-98.5  F (36.9  C)] 97.6  F (36.4  C)  Pulse:  [55-74] 67  Resp:  [16-18] 18  BP: (126-144)/(54-77) 140/68  SpO2:  [95 %-100 %] 99 %    Constitutional: Awake, alert, cooperative, no apparent distress  Lungs: Clear to auscultation bilaterally, no crackles or wheezing  Cardiovascular: S1S2, sternal surgical site intact  Abdomen:  soft,non-tender  Skin: No rash    Other:    Medications     dextrose       BETA BLOCKER NOT PRESCRIBED       sodium chloride Stopped (04/10/22 0800)       amLODIPine  10 mg Oral Daily     aspirin  81 mg Oral or NG Tube Daily     buPROPion  300 mg Oral QAM     carvedilol  3.125 mg Oral BID w/meals     ferrous gluconate  324 mg Oral Daily with breakfast     gabapentin  100 mg Oral TID     insulin aspart  1-7 Units Subcutaneous TID AC     insulin aspart  1-5 Units Subcutaneous At Bedtime     levothyroxine  125 mcg Oral Once per day on Mon Tue Wed Thu Fri Sat     levothyroxine  250 mcg Oral Weekly     lidocaine  2 patch Transdermal Q24H     lidocaine   Transdermal Q8H     loratadine  10 mg Oral Daily     methocarbamol  1,000 mg Oral Q6H     nafcillin  2 g Intravenous Q4H     pantoprazole  40 mg Oral Daily     polyethylene glycol  17 g Oral BID     senna-docusate  1 tablet Oral BID     sodium chloride (PF)  10-40 mL Intracatheter Q8H       Data   All microbiology laboratory data reviewed.  Recent Labs   Lab Test 04/14/22  0610 04/13/22  0613 04/12/22  0559   WBC 7.7 8.8 9.8   HGB 8.1* 8.0* 7.4*   HCT 27.5* 27.1* 25.1*   MCV 99 96 98    436 480*     Recent Labs   Lab Test 04/15/22  0611 04/14/22  0610  04/13/22  0613   CR 1.41* 1.45* 1.49*          ITZ MOHAN  52y  Female [id number]    HPI:  53 y/o G0 LMP 2/5 with a PMH of anemia presenting with right pneumothorax and right pleural effusion. Patient states she had a physical with her PMD in which a CXR was ordered as part of her routine annual exam (has had positive QuantiFeron in the past). The CXR showed "fluid in her lungs" and she was advised to report to the Emergency Room. Pt states at this time she was experiencing mild shortness of breath which she experiences often and attributes to her symptomatic anemia. She states she was seen in December at [facility] for SOB in which she was found to have a right side pneumothorax           Name of GYN Physician: Denies seeing GYN within last 5 years  OBHx: No history of pregnancy   GynHx: Denies fibroids, cysts, endometriosis, STI's, Abnormal pap smears   PMH: anemia, pre-diabetes  PSH: Denies prior to this hospital admission  Meds: Iron tablets  Allx: NKDA  Social History:  Denies smoking use, drug use, alcohol use.     Vital Signs Last 24 Hrs  T(C): 37.2 (09 Feb 2023 13:11), Max: 37.2 (09 Feb 2023 13:11)  T(F): 98.9 (09 Feb 2023 13:11), Max: 98.9 (09 Feb 2023 13:11)  HR: 75 (09 Feb 2023 13:11) (70 - 87)  BP: 123/69 (09 Feb 2023 13:11) (93/52 - 123/69)  BP(mean): --  RR: 17 (09 Feb 2023 13:11) (16 - 18)  SpO2: 98% (09 Feb 2023 13:11) (96% - 100%)    Parameters below as of 09 Feb 2023 13:11  Patient On (Oxygen Delivery Method): room air        Physical Exam:   General: sitting comfortably in bed, NAD   HEENT: neck supple, full ROM  CV: RRR  Lungs: CTA b/l, good air flow b/l   Back: No CVA tenderness  Abd: Soft, non-tender, non-distended.  Bowel sounds present.    :  No bleeding on pad.    External labia wnl.  Bimanual exam with no cervical motion tenderness, uterus wnl, adnexa non palpable b/l.  Cervix closed vs. Cervix dilated  Speculum Exam: No active bleeding from os.  Physiologic discharge.    Ext: non-tender b/l, no edema     LABS:                              6.9    8.80  )-----------( 534      ( 09 Feb 2023 05:14 )             25.0     02-09    139  |  104  |  8   ----------------------------<  112<H>  3.9   |  25  |  0.83    Ca    8.9      09 Feb 2023 05:14  Phos  4.4     02-09  Mg     1.90     02-09      I&O's Detail    08 Feb 2023 07:01  -  09 Feb 2023 07:00  --------------------------------------------------------  IN:    Oral Fluid: 600 mL  Total IN: 600 mL    OUT:    Chest Tube (mL): 34 mL    Voided (mL): 550 mL  Total OUT: 584 mL    Total NET: 16 mL              RADIOLOGY & ADDITIONAL STUDIES:     ITZ MOHAN  52y  Female [id number]    HPI:  51 y/o G0 LMP 2/5 with a PMH of anemia presenting with right pneumothorax and right pleural effusion. Patient states she had a physical with her PMD in which a CXR was ordered as part of her routine annual exam (has had positive QuantiFeron in the past). The CXR showed "fluid in her lungs" and she was advised to report to the Emergency Room. Pt states at this time she was experiencing mild shortness of breath. She states she was seen in December at [facility] for SOB in which she was found to have a right side pneumothorax which was treated with a pigtail and was discharged thereafter. Pt states she had her period one week prior to her ptx in December and is on her menstrual cycle now. She states her periods come around the same time each month, usually last 7 days and have always been fairly heavy in which she has to change 4 maxi pads per day in the first 3 days of her period. She states once a year for the past 5+ years she has experienced one event of severe SOB while being on her period. She never had this worked up as she attributed it to her known anemia. She has never had a blood transfusion due to the anemia but takes iron tablets daily. She denies severe pelvic pain with her menstrual cycles, denies dyschezia, dysuria, abnormal vaginal discharge, chest pain, fevers, chills, nausea or vomiting.           Name of GYN Physician: Denies seeing GYN within last 5 years  OBHx: No history of pregnancy   GynHx: Denies fibroids, cysts, endometriosis, STI's, Abnormal pap smears   PMH: anemia, pre-diabetes  PSH: Denies prior to this hospital admission  Meds: Iron tablets  Allx: NKDA  Social History:  Denies smoking use, drug use, alcohol use.     Vital Signs Last 24 Hrs  T(C): 37.2 (09 Feb 2023 13:11), Max: 37.2 (09 Feb 2023 13:11)  T(F): 98.9 (09 Feb 2023 13:11), Max: 98.9 (09 Feb 2023 13:11)  HR: 75 (09 Feb 2023 13:11) (70 - 87)  BP: 123/69 (09 Feb 2023 13:11) (93/52 - 123/69)  BP(mean): --  RR: 17 (09 Feb 2023 13:11) (16 - 18)  SpO2: 98% (09 Feb 2023 13:11) (96% - 100%)    Parameters below as of 09 Feb 2023 13:11  Patient On (Oxygen Delivery Method): room air        Physical Exam:   General: sitting comfortably in bed, NAD   HEENT: neck supple, full ROM  CV: RRR  Lungs: CTA b/l, good air flow b/l. R chest tube in tact  Back: No CVA tenderness  Abd: Soft, non-tender, non-distended.  Bowel sounds present.    : Deferred  Speculum Exam: Deferred    LABS:                       6.9    8.80  )-----------( 534      ( 09 Feb 2023 05:14 )             25.0     02-09    139  |  104  |  8   ----------------------------<  112<H>  3.9   |  25  |  0.83    Ca    8.9      09 Feb 2023 05:14  Phos  4.4     02-09  Mg     1.90     02-09      I&O's Detail    08 Feb 2023 07:01  -  09 Feb 2023 07:00  --------------------------------------------------------  IN:    Oral Fluid: 600 mL  Total IN: 600 mL    OUT:    Chest Tube (mL): 34 mL    Voided (mL): 550 mL  Total OUT: 584 mL    Total NET: 16 mL              RADIOLOGY & ADDITIONAL STUDIES:    < from: US Transvaginal (02.08.23 @ 15:04) >  ACC: [id number] EXAM:  US TRANSVAGINAL   ORDERED BY: GREY LINARES     PROCEDURE DATE:  02/08/2023          INTERPRETATION:  CLINICAL INFORMATION: Recurrent spontaneous pneumothorax   status post right VATS. Suspected catamenial pneumothorax, assess for   endometriosis.    LMP: Currently menstruating    COMPARISON: None available.    TECHNIQUE:  Endovaginal pelvic sonogram as per order. Transabdominal pelvic sonogram   was also performed as part of our standard protocol.    FINDINGS:  Uterus: 9.3cm x 5.5 cm x 6.1 cm. Anterior intramural fibroid measuring   2.7 x 2.6 x 2.5 cm.  Endometrium: 11 mm. A 1.4 x 0.8 x 1.2 cm echogenic focus in the   endometrial cavity with color flow, suggestive of a polyp.    Right ovary: 3.4 cm x 1.5 cm x 1.9 cm. A 1.2 x 0.7 x 1.5 cm hypoechoic   focus, may represent involuting corpus luteum.  Left ovary: 3.0 cm x 1.3 cm x 1.8 cm. A 1.4 x 1.0 x 1.2 cm hypoechoic,   may represent involuting corpus luteum.    Fluid: None.    IMPRESSION:  Fibroid uterus.    1.4 cm echogenic focus in the endometrial cavity with color flow,   suggestive of an endometrial polyp.    Small bilateral ovarian hypoechoic foci may represent involuting corpus   luteum. Consider follow-up sonogram in 6 weeks as clinically warranted or   further evaluation with pelvic MRI as it is more sensitive for the   evaluation of endometriosis.    --- End of Report ---          KAMARI YOUSSEF MD; Resident Radiologist  This document has been electronically signed.  AMAIRANI WILKINSON MD; Attending Radiologist  This document has been electronically signed. Feb 8 2023  5:24PM    < end of copied text >   ITZ MOHAN  52y  Female [id number]    HPI:  53 y/o G0 LMP 2/5 with a PMH of anemia presenting with right pneumothorax and right pleural effusion. Patient states she had a physical with her PMD in which a CXR was ordered as part of her routine annual exam (has had positive QuantiFeron in the past). The CXR showed "fluid in her lungs" and she was advised to report to the Emergency Room. Pt states at this time she was experiencing mild shortness of breath. She states she was seen in December at [facility] for SOB in which she was found to have a right side pneumothorax which was treated with a pigtail and was discharged thereafter. Pt states she had her period one week prior to her ptx in December and is on her menstrual cycle now. She states her periods come around the same time each month, usually last 7 days and have always been fairly heavy in which she has to change 4 maxi pads per day in the first 3 days of her period. She states once a year for the past 5+ years she has experienced one event of severe SOB while being on her period. She never had this worked up as she attributed it to her known anemia. She has never had a blood transfusion due to the anemia but takes iron tablets daily. She denies severe pelvic pain with her menstrual cycles, denies dyschezia, dysuria, abnormal vaginal discharge, chest pain, fevers, chills, nausea or vomiting.         Name of GYN Physician: Denies seeing GYN within last 5 years  OBHx: No history of pregnancy   GynHx: Denies fibroids, cysts, endometriosis, STI's, Abnormal pap smears   PMH: anemia, pre-diabetes  PSH: Denies prior to this hospital admission  Meds: Iron tablets  Allx: NKDA  Social History:  Denies smoking use, drug use, alcohol use.     Vital Signs Last 24 Hrs  T(C): 37.2 (09 Feb 2023 13:11), Max: 37.2 (09 Feb 2023 13:11)  T(F): 98.9 (09 Feb 2023 13:11), Max: 98.9 (09 Feb 2023 13:11)  HR: 75 (09 Feb 2023 13:11) (70 - 87)  BP: 123/69 (09 Feb 2023 13:11) (93/52 - 123/69)  BP(mean): --  RR: 17 (09 Feb 2023 13:11) (16 - 18)  SpO2: 98% (09 Feb 2023 13:11) (96% - 100%)    Parameters below as of 09 Feb 2023 13:11  Patient On (Oxygen Delivery Method): room air        Physical Exam:   General: sitting comfortably in bed, NAD   HEENT: neck supple, full ROM  CV: RRR  Lungs: CTA b/l, good air flow b/l. R chest tube in tact  Back: No CVA tenderness  Abd: Soft, non-tender, non-distended.  Bowel sounds present.    : Deferred  Speculum Exam: Deferred    LABS:                       6.9    8.80  )-----------( 534      ( 09 Feb 2023 05:14 )             25.0     02-09    139  |  104  |  8   ----------------------------<  112<H>  3.9   |  25  |  0.83    Ca    8.9      09 Feb 2023 05:14  Phos  4.4     02-09  Mg     1.90     02-09      I&O's Detail    08 Feb 2023 07:01  -  09 Feb 2023 07:00  --------------------------------------------------------  IN:    Oral Fluid: 600 mL  Total IN: 600 mL    OUT:    Chest Tube (mL): 34 mL    Voided (mL): 550 mL  Total OUT: 584 mL    Total NET: 16 mL              RADIOLOGY & ADDITIONAL STUDIES:    < from: US Transvaginal (02.08.23 @ 15:04) >  ACC: [id number] EXAM:  US TRANSVAGINAL   ORDERED BY: GREY LINARES     PROCEDURE DATE:  02/08/2023          INTERPRETATION:  CLINICAL INFORMATION: Recurrent spontaneous pneumothorax   status post right VATS. Suspected catamenial pneumothorax, assess for   endometriosis.    LMP: Currently menstruating    COMPARISON: None available.    TECHNIQUE:  Endovaginal pelvic sonogram as per order. Transabdominal pelvic sonogram   was also performed as part of our standard protocol.    FINDINGS:  Uterus: 9.3cm x 5.5 cm x 6.1 cm. Anterior intramural fibroid measuring   2.7 x 2.6 x 2.5 cm.  Endometrium: 11 mm. A 1.4 x 0.8 x 1.2 cm echogenic focus in the   endometrial cavity with color flow, suggestive of a polyp.    Right ovary: 3.4 cm x 1.5 cm x 1.9 cm. A 1.2 x 0.7 x 1.5 cm hypoechoic   focus, may represent involuting corpus luteum.  Left ovary: 3.0 cm x 1.3 cm x 1.8 cm. A 1.4 x 1.0 x 1.2 cm hypoechoic,   may represent involuting corpus luteum.    Fluid: None.    IMPRESSION:  Fibroid uterus.    1.4 cm echogenic focus in the endometrial cavity with color flow,   suggestive of an endometrial polyp.    Small bilateral ovarian hypoechoic foci may represent involuting corpus   luteum. Consider follow-up sonogram in 6 weeks as clinically warranted or   further evaluation with pelvic MRI as it is more sensitive for the   evaluation of endometriosis.    --- End of Report ---          KAMARI YOUSSEF MD; Resident Radiologist  This document has been electronically signed.  AMAIRANI WILKINSON MD; Attending Radiologist  This document has been electronically signed. Feb 8 2023  5:24PM    < end of copied text >

## 2023-02-09 NOTE — PROGRESS NOTE ADULT - SUBJECTIVE AND OBJECTIVE BOX
Subjective: 52y Female seen at bedside this morning, no acute events overnight. patient alert and oriented, states "I feel better today". Denies n/v/d, dizziness, HA. Her R CT remains in place to SXN with minimal output. Critical value this AM from  at 6.9/25, BP stable, NSR.     Vital Signs:  Vital Signs Last 24 Hrs  T(C): 36.9 (02-09-23 @ 08:00), Max: 37 (02-08-23 @ 12:00)  T(F): 98.5 (02-09-23 @ 08:00), Max: 98.6 (02-08-23 @ 12:00)  HR: 74 (02-09-23 @ 08:00) (73 - 87)  BP: 93/52 (02-09-23 @ 08:00) (93/52 - 118/59)  RR: 16 (02-09-23 @ 08:00) (16 - 18)  SpO2: 100% (02-09-23 @ 08:00) (96% - 100%) on (O2)    Pertinent Physical Exam:  Telemetry/Alarms: NSR  General: WN/WD NAD  Neurology: Awake, nonfocal  ENT:Gross hearing intact, grossly patent pharynx, no stridor  Neck: trachea midline, No JVD,   Respiratory: No wheezing, rales, rhonchi  CV: RRR, Abdominal: Soft, NT, ND +BS,   Extremities: No edema, + peripheral pulses  Skin: No Rashes, Hematoma, Ecchymosis  Psych: Oriented x 3, normal affect  Incisions: pink warm, dry  Tubes:Right    Chest Tube: Right Side   x2  Air Leak: Yes[] / No[x]       I&O's Summary    08 Feb 2023 07:01  -  09 Feb 2023 07:00  --------------------------------------------------------  IN: 600 mL / OUT: 584 mL / NET: 16 mL        Relevant labs, radiology and Medications reviewed                        6.9    8.80  )-----------( 534      ( 09 Feb 2023 05:14 )             25.0     02-09    139  |  104  |  8   ----------------------------<  112<H>  3.9   |  25  |  0.83    Ca    8.9      09 Feb 2023 05:14  Phos  4.4     02-09  Mg     1.90     02-09            MEDICATIONS  (STANDING):  acetaminophen   IVPB .. 1000 milliGRAM(s) IV Intermittent once  ascorbic acid 500 milliGRAM(s) Oral daily  dornase fer Solution 2.5 milliGRAM(s) Inhalation daily  ferrous    sulfate 325 milliGRAM(s) Oral daily  folic acid 1 milliGRAM(s) Oral daily  heparin   Injectable 5000 Unit(s) SubCutaneous every 12 hours  influenza   Vaccine 0.5 milliLiter(s) IntraMuscular once  polyethylene glycol 3350 17 Gram(s) Oral daily  sodium chloride 3%  Inhalation 4 milliLiter(s) Inhalation every 6 hours    MEDICATIONS  (PRN):  lidocaine   4% Patch 1 Patch Transdermal every 24 hours PRN surgical area  naloxone Injectable 0.1 milliGRAM(s) IV Push every 3 minutes PRN For ANY of the following changes in patient status:  A. RR LESS THAN 10 breaths per minute, B. Oxygen saturation LESS THAN 90%, C. Sedation score of 6  ondansetron Injectable 4 milliGRAM(s) IV Push every 6 hours PRN Nausea  oxyCODONE    IR 5 milliGRAM(s) Oral every 3 hours PRN Moderate Pain (4 - 6)  oxyCODONE    IR 10 milliGRAM(s) Oral every 3 hours PRN Severe Pain (7 - 10)  senna 2 Tablet(s) Oral at bedtime PRN Constipation        Assessment  52F hx iron deficiency anemia, p/w recurrent spontaneous Rt hydropneumothorax found incidentally on OP CXR. Now s/p RVATS, RUL Wedge, Pneumolysis, Mechanical and chemical pleurodesis, diaphragm plication (02/06/23).    2/9: Pt with critical HH this morning 6.9/25, hx of anemia and hemodynamically stable with no symptoms. Repeat noon labs and consider 1PRBC. GYN consulted, TVUS performed. Will f/u today with recs.         PLAN  Neuro: Pain management with PRN oxy and tylenol  Pulm: Encourage coughing, deep breathing and use of incentive spirometry. daily CXR. Cpnt with duonebs and chest PT  Cardio: Monitor telemetry/alarms.  GI: Tolerating diet. Continue stool softeners.  Renal: monitor urine output, supplement electrolytes as needed  Vasc: Heparin SC/SCDs for DVT prophylaxis  Heme: Anemic without sings of acute bleeding. Repeat labs at noon. Monitor hemodynamics, will consider PRBC. Cont with daily iron, vit c and folic acid.   GYN: pending recs. TVUS performed.   Endocrine: Blood glucose checks per routine. ISS   ID: Off antibiotics. Stable.  Therapy: OOB/ambulate  Tubes: Monitor Chest tube output, WS today  Disposition: Pending GYN recs. WS Ct today and monitor HH and hemodynaics.   Discussed with Cardiothoracic Team at AM rounds.   Subjective: 52y Female seen at bedside this morning, no acute events overnight. patient alert and oriented, states "I feel better today". Denies n/v/d, dizziness, HA. Her R CT remains in place to SXN with minimal output. Critical value this AM from  at 6.9/25, BP stable, NSR.     Vital Signs:  Vital Signs Last 24 Hrs  T(C): 36.9 (02-09-23 @ 08:00), Max: 37 (02-08-23 @ 12:00)  T(F): 98.5 (02-09-23 @ 08:00), Max: 98.6 (02-08-23 @ 12:00)  HR: 74 (02-09-23 @ 08:00) (73 - 87)  BP: 93/52 (02-09-23 @ 08:00) (93/52 - 118/59)  RR: 16 (02-09-23 @ 08:00) (16 - 18)  SpO2: 100% (02-09-23 @ 08:00) (96% - 100%) on (O2)    Pertinent Physical Exam:  Telemetry/Alarms: NSR  General: WN/WD NAD  Neurology: Awake, nonfocal  ENT:Gross hearing intact, grossly patent pharynx, no stridor  Neck: trachea midline, No JVD,   Respiratory: No wheezing, rales, rhonchi  CV: RRR, Abdominal: Soft, NT, ND +BS,   Extremities: No edema, + peripheral pulses  Skin: No Rashes, Hematoma, Ecchymosis  Psych: Oriented x 3, normal affect  Incisions: pink warm, dry  Tubes:Right    Chest Tube: Right Side   x2  Air Leak: Yes[] / No[x]       I&O's Summary    08 Feb 2023 07:01  -  09 Feb 2023 07:00  --------------------------------------------------------  IN: 600 mL / OUT: 584 mL / NET: 16 mL        Relevant labs, radiology and Medications reviewed                        6.9    8.80  )-----------( 534      ( 09 Feb 2023 05:14 )             25.0     02-09    139  |  104  |  8   ----------------------------<  112<H>  3.9   |  25  |  0.83    Ca    8.9      09 Feb 2023 05:14  Phos  4.4     02-09  Mg     1.90     02-09            MEDICATIONS  (STANDING):  acetaminophen   IVPB .. 1000 milliGRAM(s) IV Intermittent once  ascorbic acid 500 milliGRAM(s) Oral daily  dornase fer Solution 2.5 milliGRAM(s) Inhalation daily  ferrous    sulfate 325 milliGRAM(s) Oral daily  folic acid 1 milliGRAM(s) Oral daily  heparin   Injectable 5000 Unit(s) SubCutaneous every 12 hours  influenza   Vaccine 0.5 milliLiter(s) IntraMuscular once  polyethylene glycol 3350 17 Gram(s) Oral daily  sodium chloride 3%  Inhalation 4 milliLiter(s) Inhalation every 6 hours    MEDICATIONS  (PRN):  lidocaine   4% Patch 1 Patch Transdermal every 24 hours PRN surgical area  naloxone Injectable 0.1 milliGRAM(s) IV Push every 3 minutes PRN For ANY of the following changes in patient status:  A. RR LESS THAN 10 breaths per minute, B. Oxygen saturation LESS THAN 90%, C. Sedation score of 6  ondansetron Injectable 4 milliGRAM(s) IV Push every 6 hours PRN Nausea  oxyCODONE    IR 5 milliGRAM(s) Oral every 3 hours PRN Moderate Pain (4 - 6)  oxyCODONE    IR 10 milliGRAM(s) Oral every 3 hours PRN Severe Pain (7 - 10)  senna 2 Tablet(s) Oral at bedtime PRN Constipation        Assessment  52F hx iron deficiency anemia, p/w recurrent spontaneous Rt hydropneumothorax found incidentally on OP CXR. Now s/p RVATS, RUL Wedge, Pneumolysis, Mechanical and chemical pleurodesis, diaphragm plication (02/06/23).    2/9: Pt with critical HH this morning 6.9/25, hx of anemia and hemodynamically stable with no symptoms. Repeat noon labs and consider 1PRBC. GYN consulted, TVUS performed. Will f/u today with recs.         PLAN  Neuro: Pain management with PRN oxy and tylenol  Pulm: Encourage coughing, deep breathing and use of incentive spirometry. daily CXR. Cpnt with duonebs and chest PT  Cardio: Monitor telemetry/alarms.  GI: Tolerating diet. Continue stool softeners.  Renal: monitor urine output, supplement electrolytes as needed  Vasc: Heparin SC/SCDs for DVT prophylaxis  Heme: Anemic without sings of acute bleeding. Repeat labs at noon. Monitor hemodynamics, will consider PRBC. Cont with daily iron, vit c and folic acid.   GYN: pending recs. TVUS performed. Pt with menses at this time   Endocrine: Blood glucose checks per routine. ISS   ID: Off antibiotics. Stable.  Therapy: OOB/ambulate  Tubes: Monitor Chest tube output, WS today  Disposition: Pending GYN recs. WS Ct today and monitor HH and hemodynaics.   Discussed with Cardiothoracic Team at AM rounds.

## 2023-02-09 NOTE — PROVIDER CONTACT NOTE (CRITICAL VALUE NOTIFICATION) - BACKGROUND
52F hx iron deficiency anemia, p/w recurrent spontaneous Rt hydropneumothorax found incidentally on OP CXR. Now s/p RVATS, RUL Wedge, Pneumolysis, Mechanical and chemical pleurodesis, diaphragm plication (02/06/23).

## 2023-02-09 NOTE — PROGRESS NOTE ADULT - PROVIDER SPECIALTY LIST ADULT
Pain Medicine
Anesthesia
Critical Care
Critical Care
Thoracic Surgery

## 2023-02-09 NOTE — CONSULT NOTE ADULT - ATTENDING COMMENTS
Case d/w GYN team.  Agree w/assessment and plan.    53 y/o G0 w/s/sx suggestive of catamenial pneumothorax. As stated above, would consider outpt suppressive therapy depending on pathology results.   Will discuss further w/pt.

## 2023-02-09 NOTE — CONSULT NOTE ADULT - ASSESSMENT
Incomplete note, final recs pending.  51 y/o G0 LMP 2/5 with a PMH of anemia presenting with right pneumothorax and right pleural effusion. Though patient has never been diagnosed with endometriosis, clinical picture suggests catamenial pneumothorax as a potential differential diagnosis.     Incomplete note, final recs pending discussion with GYN attending.  53 y/o G0 LMP 2/5 with a PMH of anemia presenting with incidental finding of right pneumothorax and right pleural effusion on outpt imaging. Patient now s/p RVATS, RUL Wedge, Pneumolysis, Mechanical and chemical pleurodesis, diaphragm plication with Thoracic Surgery on 02/06/23. Though patient has never been diagnosed with endometriosis, clinical picture suggests catamenial pneumothorax as a potential differential diagnosis due to relationship of her previous pneumothorax in December and now this event being associated with her menstrual cycle. Patient denies many key clinical findings of endometriosis such as dysmenorrhea or dyschezia but would recommend further workup which can be completed outpatient pending results of surgical biopsy.     - No acute GYN intervention at this time  - Follow up surgical pathology  - Discussed with patient the possibility of starting Depo/Lupron outpatient for suppression of menstrual cycle and possible endometriosis pending surgical pathology  - Patient stable for outpatient follow up from GYN perspective. [facility] GYN clinic will reach out to patient to schedule appointment    -JOVON Schafer  d/w GYN Team & Dr. Trevino

## 2023-02-10 ENCOUNTER — TRANSCRIPTION ENCOUNTER (OUTPATIENT)
Age: 53
End: 2023-02-10

## 2023-02-10 VITALS — WEIGHT: 179.9 LBS

## 2023-02-10 LAB
ALBUMIN SERPL ELPH-MCNC: 3 G/DL — LOW (ref 3.3–5)
ALP SERPL-CCNC: 72 U/L — SIGNIFICANT CHANGE UP (ref 40–120)
ALT FLD-CCNC: 22 U/L — SIGNIFICANT CHANGE UP (ref 4–33)
ANION GAP SERPL CALC-SCNC: 10 MMOL/L — SIGNIFICANT CHANGE UP (ref 7–14)
AST SERPL-CCNC: 19 U/L — SIGNIFICANT CHANGE UP (ref 4–32)
BILIRUB SERPL-MCNC: 0.5 MG/DL — SIGNIFICANT CHANGE UP (ref 0.2–1.2)
BUN SERPL-MCNC: 7 MG/DL — SIGNIFICANT CHANGE UP (ref 7–23)
CALCIUM SERPL-MCNC: 8.9 MG/DL — SIGNIFICANT CHANGE UP (ref 8.4–10.5)
CHLORIDE SERPL-SCNC: 104 MMOL/L — SIGNIFICANT CHANGE UP (ref 98–107)
CO2 SERPL-SCNC: 27 MMOL/L — SIGNIFICANT CHANGE UP (ref 22–31)
CREAT SERPL-MCNC: 0.78 MG/DL — SIGNIFICANT CHANGE UP (ref 0.5–1.3)
EGFR: 92 ML/MIN/1.73M2 — SIGNIFICANT CHANGE UP
GLUCOSE SERPL-MCNC: 106 MG/DL — HIGH (ref 70–99)
HCT VFR BLD CALC: 26.4 % — LOW (ref 34.5–45)
HGB BLD-MCNC: 7.7 G/DL — LOW (ref 11.5–15.5)
MCHC RBC-ENTMCNC: 18.2 PG — LOW (ref 27–34)
MCHC RBC-ENTMCNC: 29.2 GM/DL — LOW (ref 32–36)
MCV RBC AUTO: 62.3 FL — LOW (ref 80–100)
NRBC # BLD: 0 /100 WBCS — SIGNIFICANT CHANGE UP (ref 0–0)
NRBC # FLD: 0 K/UL — SIGNIFICANT CHANGE UP (ref 0–0)
PLATELET # BLD AUTO: 528 K/UL — HIGH (ref 150–400)
POTASSIUM SERPL-MCNC: 4 MMOL/L — SIGNIFICANT CHANGE UP (ref 3.5–5.3)
POTASSIUM SERPL-SCNC: 4 MMOL/L — SIGNIFICANT CHANGE UP (ref 3.5–5.3)
PROT SERPL-MCNC: 6.5 G/DL — SIGNIFICANT CHANGE UP (ref 6–8.3)
RBC # BLD: 4.24 M/UL — SIGNIFICANT CHANGE UP (ref 3.8–5.2)
RBC # FLD: 22.4 % — HIGH (ref 10.3–14.5)
SODIUM SERPL-SCNC: 141 MMOL/L — SIGNIFICANT CHANGE UP (ref 135–145)
WBC # BLD: 7.81 K/UL — SIGNIFICANT CHANGE UP (ref 3.8–10.5)
WBC # FLD AUTO: 7.81 K/UL — SIGNIFICANT CHANGE UP (ref 3.8–10.5)

## 2023-02-10 PROCEDURE — 71045 X-RAY EXAM CHEST 1 VIEW: CPT | Mod: 26

## 2023-02-10 RX ORDER — FOLIC ACID 0.8 MG
1 TABLET ORAL
Qty: 0 | Refills: 0 | DISCHARGE
Start: 2023-02-10

## 2023-02-10 RX ORDER — ASCORBIC ACID 60 MG
1 TABLET,CHEWABLE ORAL
Qty: 0 | Refills: 0 | DISCHARGE
Start: 2023-02-10

## 2023-02-10 RX ORDER — OXYCODONE HYDROCHLORIDE 5 MG/1
1 TABLET ORAL
Qty: 30 | Refills: 0
Start: 2023-02-10 | End: 2023-02-14

## 2023-02-10 RX ADMIN — OXYCODONE HYDROCHLORIDE 10 MILLIGRAM(S): 5 TABLET ORAL at 00:58

## 2023-02-10 RX ADMIN — OXYCODONE HYDROCHLORIDE 10 MILLIGRAM(S): 5 TABLET ORAL at 01:45

## 2023-02-10 RX ADMIN — Medication 325 MILLIGRAM(S): at 12:24

## 2023-02-10 RX ADMIN — POLYETHYLENE GLYCOL 3350 17 GRAM(S): 17 POWDER, FOR SOLUTION ORAL at 12:24

## 2023-02-10 RX ADMIN — Medication 1 MILLIGRAM(S): at 12:24

## 2023-02-10 RX ADMIN — Medication 500 MILLIGRAM(S): at 12:24

## 2023-02-10 RX ADMIN — HEPARIN SODIUM 5000 UNIT(S): 5000 INJECTION INTRAVENOUS; SUBCUTANEOUS at 06:39

## 2023-02-10 NOTE — DISCHARGE NOTE PROVIDER - NSDCMRMEDTOKEN_GEN_ALL_CORE_FT
ascorbic acid 500 mg oral tablet: 1 tab(s) orally once a day  ferrous sulfate 325 mg (65 mg elemental iron) oral tablet: 1 tab(s) orally once a day  folic acid 1 mg oral tablet: 1 tab(s) orally once a day  polyethylene glycol 3350 oral powder for reconstitution: 17 gram(s) orally once a day  senna leaf extract oral tablet: 2 tab(s) orally once a day (at bedtime)  Tylenol 325 mg oral tablet: 2 tab(s) orally every 6 hours, As Needed   ascorbic acid 500 mg oral tablet: 1 tab(s) orally once a day  ferrous sulfate 325 mg (65 mg elemental iron) oral tablet: 1 tab(s) orally once a day  folic acid 1 mg oral tablet: 1 tab(s) orally once a day  oxyCODONE 5 mg oral tablet: 1 tab(s) orally every 4 hours, As Needed -Moderate Pain (4 - 6) MDD:6  PA/Lat Chest Xray: Chest Xray PA/Lat  Dx: s/p RVATS, Pneumolysis, RUL Wedge, Mechanical and Chemical Pleurodesis. ICD10: R91.8  polyethylene glycol 3350 oral powder for reconstitution: 17 gram(s) orally once a day  senna leaf extract oral tablet: 2 tab(s) orally once a day (at bedtime)  Tylenol 325 mg oral tablet: 2 tab(s) orally every 6 hours, As Needed

## 2023-02-10 NOTE — DISCHARGE NOTE PROVIDER - NSDCFUADDINST_GEN_ALL_CORE_FT
Please continue to walk daily, increasing as tolerated, practice deep breathing and coughing. Please shower daily, allow water and soap to run over your incisions site, do not scrub. Pat to dry and leave open to air. Please monitor your surgical sites for increased swelling, pain, redness, discharge or warmth from the area. If these symptoms or new SOB, chest pain, fevers or chills occur, please call the office, [phone].

## 2023-02-10 NOTE — DIETITIAN INITIAL EVALUATION ADULT - NSFNSGIIOFT_GEN_A_CORE
02-09-23 @ 07:01  -  02-10-23 @ 07:00  --------------------------------------------------------  OUT:    Chest Tube (mL): 0 mL  Total OUT: 0 mL    Total NET: 0 mL

## 2023-02-10 NOTE — DIETITIAN INITIAL EVALUATION ADULT - PERTINENT MEDS FT
MEDICATIONS  (STANDING):  acetaminophen   IVPB .. 1000 milliGRAM(s) IV Intermittent once  ascorbic acid 500 milliGRAM(s) Oral daily  ferrous    sulfate 325 milliGRAM(s) Oral daily  folic acid 1 milliGRAM(s) Oral daily  heparin   Injectable 5000 Unit(s) SubCutaneous every 12 hours  influenza   Vaccine 0.5 milliLiter(s) IntraMuscular once  polyethylene glycol 3350 17 Gram(s) Oral daily    MEDICATIONS  (PRN):  lidocaine   4% Patch 1 Patch Transdermal every 24 hours PRN surgical area  naloxone Injectable 0.1 milliGRAM(s) IV Push every 3 minutes PRN For ANY of the following changes in patient status:  A. RR LESS THAN 10 breaths per minute, B. Oxygen saturation LESS THAN 90%, C. Sedation score of 6  ondansetron Injectable 4 milliGRAM(s) IV Push every 6 hours PRN Nausea  oxyCODONE    IR 5 milliGRAM(s) Oral every 3 hours PRN Moderate Pain (4 - 6)  oxyCODONE    IR 10 milliGRAM(s) Oral every 3 hours PRN Severe Pain (7 - 10)  senna 2 Tablet(s) Oral at bedtime PRN Constipation

## 2023-02-10 NOTE — DISCHARGE NOTE PROVIDER - NSDCCPCAREPLAN_GEN_ALL_CORE_FT
PRINCIPAL DISCHARGE DIAGNOSIS  Diagnosis: Pneumothorax, right  Assessment and Plan of Treatment:

## 2023-02-10 NOTE — DISCHARGE NOTE NURSING/CASE MANAGEMENT/SOCIAL WORK - NSDCFUADDAPPT_GEN_ALL_CORE_FT
Please follow up with Dr. Burton in 2 weeks. Please call for an appointment [phone]. Please get a new chest xray 1-2 days before seeing Dr. Burton in her office.    Follow up with your pcp in 2-3 weeks.

## 2023-02-10 NOTE — DISCHARGE NOTE NURSING/CASE MANAGEMENT/SOCIAL WORK - NSDCPNINST_GEN_ALL_CORE
Take medication as ordered, follow up with MD as advised, gradually increase activity, do not lift anything heavy, eat a heart healthy diet, monitor incision for healing,   call MD if fever over 100.4F, redness, swelling, draining at site

## 2023-02-10 NOTE — DISCHARGE NOTE PROVIDER - HOSPITAL COURSE
51 y/o female w/ pmhx of preDM, HTN and anemia presents to [facility] with spontaneous PTX found on outpatient CXR. On CT scan, found to have moderate R PTX and small R pleural effusion. Patient was taken to OR for FB, Robot Assisted RVATS, Pneumolysis, RUL wedge, Mechanical and chemical pleurodesis, diaphragm plication and pleural biopsy on 02/06/2023. Post operatively, patient was kept on suction for 3 days, transitioned to water seal for one day, and removed on 02/10/2023 with f/u CXR stable. She was seen by GYN while inpatient and had a TVUS and was instructed to follow up with GYN as an outpatient. At this point, patient was stable and cleared for discharge home with follow up instructions per Dr. Burton.    Vital Signs Last 24 Hrs  T(C): 36.3 (10 Feb 2023 12:00), Max: 37.7 (10 Feb 2023 05:17)  T(F): 97.4 (10 Feb 2023 12:00), Max: 99.9 (10 Feb 2023 05:17)  HR: 68 (10 Feb 2023 12:00) (66 - 85)  BP: 125/71 (10 Feb 2023 12:00) (97/68 - 125/71)  BP(mean): --  RR: 18 (10 Feb 2023 12:00) (16 - 18)  SpO2: 100% (10 Feb 2023 12:00) (97% - 100%)    Parameters below as of 10 Feb 2023 12:00  Patient On (Oxygen Delivery Method): room air    General: A&Ox3, NAD  HEENT: Normocephalic. Vision and hearing grossly intact, EOMI. Airway grossly patent, no stridor.  CV: RRR, well perfused  Resp: Breathing comfortably on RA, no resp distress, no accessory muscle use  GI: Soft, NT, ND, +BS  MSK: YANEZ x4  Pysch: Appropriate affect                          7.7    7.81  )-----------( 528      ( 10 Feb 2023 05:26 )             26.4       02-10    141  |  104  |  7   ----------------------------<  106<H>  4.0   |  27  |  0.78    Ca    8.9      10 Feb 2023 05:26  Phos  4.4     02-09  Mg     1.90     02-09    TPro  6.5  /  Alb  3.0<L>  /  TBili  0.5  /  DBili  x   /  AST  19  /  ALT  22  /  AlkPhos  72  02-10

## 2023-02-10 NOTE — DISCHARGE NOTE NURSING/CASE MANAGEMENT/SOCIAL WORK - PATIENT PORTAL LINK FT
You can access the FollowMyHealth Patient Portal offered by [facility] by registering at the following website: [email or web address]. By joining [employer]’s FollowMyHealth portal, you will also be able to view your health information using other applications (apps) compatible with our system.

## 2023-02-10 NOTE — DISCHARGE NOTE PROVIDER - NSDCFUADDAPPT_GEN_ALL_CORE_FT
Please follow up with Dr. Burton in 2 weeks. Please call for an appointment [phone]. Please get a new chest xray 1-2 days before seeing Dr. Burton in her office.    Follow up with your pcp in 2-3 weeks.

## 2023-02-10 NOTE — DIETITIAN INITIAL EVALUATION ADULT - OTHER INFO
As per chart, Pt is a 52F hx iron deficiency anemia, p/w recurrent spontaneous Rt hydropneumothorax found incidentally on OP CXR. Now s/p RVATS, RUL Wedge, Pneumolysis, Mechanical and chemical pleurodesis, diaphragm plication (02/06/23).  Pt states her appetite and po intake have been good. She has no complaints of GI distress (nausea, vomiting, diarrhea, constipation). Pt has no difficulty chewing or swallowing and has no known food allergies.  Pt reports her usual weight is 180 lbs and she has no difficulty maintaining it.

## 2023-02-10 NOTE — DISCHARGE NOTE PROVIDER - CARE PROVIDER_API CALL
Lisa Burton)  Surgery; Thoracic Surgery  [phone] [place] [address]  Phone: [phone]  Fax: [phone]  Follow Up Time:

## 2023-02-11 LAB
CULTURE RESULTS: SIGNIFICANT CHANGE UP
SPECIMEN SOURCE: SIGNIFICANT CHANGE UP

## 2023-02-13 LAB — NON-GYNECOLOGICAL CYTOLOGY STUDY: SIGNIFICANT CHANGE UP

## 2023-02-22 NOTE — CHART NOTE - NSCHARTNOTEFT_GEN_A_CORE
Called patient at number in chart, number is patients sisters phone number. Sister provided number to patient, [phone]. Called new number, left message to confirm patients GYN follow up after her most recent hospitalization. Will attempt to contact patient again.    JOVON Scahfer  d/w GYN team

## 2023-02-23 PROBLEM — Z00.00 ENCOUNTER FOR PREVENTIVE HEALTH EXAMINATION: Status: ACTIVE | Noted: 2023-02-23

## 2023-02-23 NOTE — CHART NOTE - NSCHARTNOTEFT_GEN_A_CORE
Spoke with patient regarding pathology results. There is no evidence of catamenial pneumothorax. Advised patient to follow up with an OBGYN. She will get in contact with her insurance company to find an OBGYN.    Birdie Waller, PGY1

## 2023-02-27 ENCOUNTER — RESULT REVIEW (OUTPATIENT)
Age: 53
End: 2023-02-27

## 2023-02-27 ENCOUNTER — APPOINTMENT (OUTPATIENT)
Dept: RADIOLOGY | Facility: HOSPITAL | Age: 53
End: 2023-02-27

## 2023-02-27 ENCOUNTER — NON-APPOINTMENT (OUTPATIENT)
Age: 53
End: 2023-02-27

## 2023-02-27 ENCOUNTER — OUTPATIENT (OUTPATIENT)
Dept: OUTPATIENT SERVICES | Facility: HOSPITAL | Age: 53
LOS: 1 days | End: 2023-02-27
Payer: COMMERCIAL

## 2023-02-27 ENCOUNTER — APPOINTMENT (OUTPATIENT)
Dept: THORACIC SURGERY | Facility: CLINIC | Age: 53
End: 2023-02-27
Payer: COMMERCIAL

## 2023-02-27 VITALS
SYSTOLIC BLOOD PRESSURE: 136 MMHG | DIASTOLIC BLOOD PRESSURE: 81 MMHG | BODY MASS INDEX: 28.06 KG/M2 | WEIGHT: 196 LBS | HEART RATE: 75 BPM | HEIGHT: 70 IN | OXYGEN SATURATION: 98 %

## 2023-02-27 DIAGNOSIS — J93.9 PNEUMOTHORAX, UNSPECIFIED: ICD-10-CM

## 2023-02-27 PROBLEM — D64.9 ANEMIA, UNSPECIFIED: Chronic | Status: ACTIVE | Noted: 2023-02-03

## 2023-02-27 PROCEDURE — 71046 X-RAY EXAM CHEST 2 VIEWS: CPT | Mod: 26

## 2023-02-27 PROCEDURE — 99024 POSTOP FOLLOW-UP VISIT: CPT

## 2023-02-27 RX ORDER — FERROUS SULFATE 325(65) MG
1 TABLET ORAL
Qty: 0 | Refills: 0 | DISCHARGE

## 2023-02-27 RX ORDER — ACETAMINOPHEN 500 MG
2 TABLET ORAL
Qty: 0 | Refills: 0 | DISCHARGE

## 2023-02-27 NOTE — REASON FOR VISIT
[de-identified] : Robotic-assisted, Rt VATS, RUL wedge, mechanical and chemical pleurodesis, diaphragmatic plication and pneumolysis [de-identified] : 2/6/23

## 2023-02-27 NOTE — ASSESSMENT
[FreeTextEntry1] : Ms. ITZ MOHAN, 52 year old female, ...smoker, w/ hx of  pre-DM, HTN and anemia presents to [facility] with spontaneous PTX found on outpatient CXR. On CT scan, found to have moderate Rt PTX and small Rt pleural effusion. \par \par Now 2wks s/p Robotic-assisted, Rt VATS, RUL wedge, mechanical and chemical pleurodesis, diaphragmatic plication and pneumolysis on 2/6/23. Path of pleura revealed fibrosis, fibrous plaque with chronic inflammation. 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 \par \par CXR today--- reviewed. Pt reports some pain from surgery, denies SOB, cough or CP. \par \par I have reviewed the patient's medical records and diagnostic images at time of this office consultation and have made the following recommendation:\par 1. CXR reviewed, RTC in 6 wks with CXR. \par \par \par I, Dr. VALDERRAMA, SARAH BALES, personally performed the evaluation and management (E/M) services for this established patient who presents today with (a) new problem(s)/exacerbation of (an) existing condition(s).  That E/M includes conducting the examination, assessing all new/exacerbated conditions, and establishing a new plan of care.  Today, my ACP, Kassandra Nath NP was here to observe my evaluation and management services for this new problem/exacerbated condition to be followed going forward.\par

## 2023-02-27 NOTE — DISCUSSION/SUMMARY
[Doing Well] : is doing well [Excellent Pain Control] : has excellent pain control [No Sign of Infection] : is showing no signs of infection [3] : 3 [Remove Sutures/Staples] : removed sutures/staples [Clinical Recheck] : clinical recheck

## 2023-03-08 LAB
CULTURE RESULTS: SIGNIFICANT CHANGE UP
SPECIMEN SOURCE: SIGNIFICANT CHANGE UP

## 2023-03-25 LAB
CULTURE RESULTS: SIGNIFICANT CHANGE UP
SPECIMEN SOURCE: SIGNIFICANT CHANGE UP

## 2023-03-29 NOTE — PATIENT PROFILE ADULT - CAREGIVER RELATION TO PATIENT
Rx Refill Note  Requested Prescriptions     Pending Prescriptions Disp Refills    cyclobenzaprine (FLEXERIL) 10 MG tablet [Pharmacy Med Name: CYCLOBENZAPRINE 10 MG TABLET] 60 tablet 0     Sig: TAKE 1 TABLET BY MOUTH 3 TIMES A DAY AS NEEDED FOR MUSCLE SPASMS.      Last office visit with prescribing clinician: 9/27/2022      Next office visit with prescribing clinician: Visit date not found   Last Filled:03.08.2023    DX:status post right total knee arthroplasty with subsequent manipulation, 10/14/2020       Daphney Paris MA  03/29/23, 14:37 EDT    {TIP  Encounters:    {TIP  Please add Last Relevant Lab Date if appropriate:  {TIP  Is Refill Pharmacy correct?:    
Sibling

## 2023-04-07 PROBLEM — J93.9 RECURRENT PNEUMOTHORAX: Status: ACTIVE | Noted: 2023-02-24

## 2023-04-07 PROBLEM — I10 ESSENTIAL (PRIMARY) HYPERTENSION: Chronic | Status: ACTIVE | Noted: 2022-12-30

## 2023-04-07 PROBLEM — D64.9 ANEMIA, UNSPECIFIED: Chronic | Status: ACTIVE | Noted: 2022-12-30

## 2023-04-07 PROBLEM — R73.03 PREDIABETES: Chronic | Status: ACTIVE | Noted: 2022-12-30

## 2023-04-10 ENCOUNTER — APPOINTMENT (OUTPATIENT)
Dept: THORACIC SURGERY | Facility: CLINIC | Age: 53
End: 2023-04-10
Payer: COMMERCIAL

## 2023-04-10 ENCOUNTER — OUTPATIENT (OUTPATIENT)
Dept: OUTPATIENT SERVICES | Facility: HOSPITAL | Age: 53
LOS: 1 days | End: 2023-04-10
Payer: COMMERCIAL

## 2023-04-10 ENCOUNTER — APPOINTMENT (OUTPATIENT)
Dept: RADIOLOGY | Facility: HOSPITAL | Age: 53
End: 2023-04-10

## 2023-04-10 VITALS
BODY MASS INDEX: 29.06 KG/M2 | SYSTOLIC BLOOD PRESSURE: 133 MMHG | HEART RATE: 97 BPM | OXYGEN SATURATION: 100 % | WEIGHT: 203 LBS | DIASTOLIC BLOOD PRESSURE: 82 MMHG | HEIGHT: 70 IN

## 2023-04-10 DIAGNOSIS — J93.9 PNEUMOTHORAX, UNSPECIFIED: ICD-10-CM

## 2023-04-10 PROCEDURE — 99024 POSTOP FOLLOW-UP VISIT: CPT

## 2023-04-10 PROCEDURE — 71046 X-RAY EXAM CHEST 2 VIEWS: CPT | Mod: 26

## 2023-04-10 RX ORDER — OXYCODONE 5 MG/1
5 TABLET ORAL
Refills: 0 | Status: COMPLETED | COMMUNITY
End: 2023-04-10

## 2023-04-10 NOTE — REASON FOR VISIT
[de-identified] : Robotic-assisted, Rt VATS, RUL wedge, mechanical and chemical pleurodesis, diaphragmatic plication and pneumolysis [de-identified] : 2/6/23

## 2023-04-10 NOTE — ASSESSMENT
[FreeTextEntry1] : Ms. ITZ MOHAN, 52 year old female, w/ hx of  pre-DM, HTN and anemia presents to [facility] with spontaneous PTX found on outpatient CXR. On CT scan, found to have moderate Rt PTX and small Rt pleural effusion. \par \par Now 8wks s/p Robotic-assisted, Rt VATS, RUL wedge, mechanical and chemical pleurodesis, diaphragmatic plication and pneumolysis on 2/6/23. Path of pleura revealed fibrosis, fibrous plaque with chronic inflammation. 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 \par \par CXR today: reviewed. \par \par Patient denies shortness of breath, cough, chest pain, fever, chills. \par \par I have reviewed the patient's medical records and diagnostic images at time of this office consultation and have made the following recommendation:\par 1. CXR reviewed and explained to patient, patient is doing well, recommended patient to RTC as needed and f/u with GYN. \par \par I, Dr. VALDERRAMA, SARAH BALES, personally performed the evaluation and management (E/M) services for this established patient who follow up today with an existing condition.  That E/M includes conducting the examination, assessing all new/exacerbated/existing conditions, and establishing a plan of care.  Today, my ACP, Catia Arthur ANP-C, was here to observe my evaluation and management services for this existing condition to be followed going forward. \par \par

## 2025-01-18 NOTE — ED PROVIDER NOTE - DATE/TIME 1
Progress Note  Date:2025       Room:[facility]  Patient Name:Norma Maguire     YOB: 1964     Age:60 y.o.        Subjective    Subjective Patient is feeling better. Headache resolved. No weakness or slurred speech.   Review of Systems  Objective         Vitals Last 24 Hours:  TEMPERATURE:  Temp  Av.2 °F (36.8 °C)  Min: 97.3 °F (36.3 °C)  Max: 99.5 °F (37.5 °C)  RESPIRATIONS RANGE: Resp  Av.3  Min: 16  Max: 17  PULSE OXIMETRY RANGE: SpO2  Av.3 %  Min: 98 %  Max: 100 %  PULSE RANGE: Pulse  Av.2  Min: 56  Max: 67  BLOOD PRESSURE RANGE: Systolic (24hrs), Av , Min:151 , Max:169   ; Diastolic (24hrs), Av, Min:73, Max:85    I/O (24Hr):  No intake or output data in the 24 hours ending 25 1023  Objective  Exam  General: Awake, alert nad  Neuro: MS Aox3 language fluent, no neglect  CN: EOM intact Facial movements symmetric Hearing intact to voice Speech clear Tongue midline  Motor: Moves all 4 extremities symmetrically    Labs/Imaging/Diagnostics    Labs:  CBC:  Recent Labs     25  0256   WBC 9.2 6.3   RBC 4.70 4.96   HGB 13.8 14.1   HCT 41.4 44.3   MCV 88.1 89.3   RDW 13.2 13.0    170     CHEMISTRIES:  Recent Labs     25  0925    145   K 3.6 3.8   * 113*   CO2 24 26   BUN 22* 14   CREATININE 0.70 0.62   GLUCOSE 86 71*     PT/INR:  Recent Labs     25   PROTIME 13.8   INR 1.0     APTT:No results for input(s): \"APTT\" in the last 72 hours.  LIVER PROFILE:  Recent Labs     25   AST 15   ALT 19   BILITOT 0.5   ALKPHOS 113       Imaging Last 24 Hours:  MRI brain without contrast    Result Date: 2025  MR BRAIN WITHOUT CONTRAST HISTORY sudden onset right arm numbness: COMPARISON: CT 2025 TECHNIQUE: Multisequence multiplanar imaging of the brain obtained without contrast. FINDINGS: Brain parenchyma: No acute infarct, hemorrhage, or mass effect. Extra-axial spaces, ventricles, basal  30-Dec-2022 13:50

## 2025-02-09 NOTE — DISCHARGE NOTE NURSING/CASE MANAGEMENT/SOCIAL WORK - NSDCPEFALRISK_GEN_ALL_CORE
2:14 PM Recheck on patient. Discussed with patient ED findings and plan for discharge. Patient was given ED warnings, discharge instructions, and follow up information to go home with. Patient understands and agrees with plan for discharge. Any questions have been answered.     For information on Fall & Injury Prevention, visit: [email or web address] OR  [email or web address] OR  https://www.cdc.gov/steadi/patient.html

## (undated) DEVICE — STAPLER COVIDIEN ENDO GIA XL HANDLE

## (undated) DEVICE — PACK MAJOR ABDOMINAL W ENDO DRAPE

## (undated) DEVICE — XI ARM GRASPER BIPOLAR LONG 8MM

## (undated) DEVICE — TUBING SUCTION NONCONDUCTIVE 6MM X 12FT

## (undated) DEVICE — CHEST DRAIN PLEUR-EVAC DRY/WET ADULT-PEDS SINGLE (QUICK)

## (undated) DEVICE — POSITIONER FOAM EGG CRATE ULNAR 2PCS (PINK)

## (undated) DEVICE — TAPE SILK 3"

## (undated) DEVICE — TROCAR SURGIQUEST AIRSEAL 12MMX100MM

## (undated) DEVICE — SYR SLIP 10CC

## (undated) DEVICE — Device

## (undated) DEVICE — XI DRAPE ARM

## (undated) DEVICE — BLADE SCALPEL SAFETYLOCK #10

## (undated) DEVICE — XI ARM GRASPER TIP UP FENESTRATED

## (undated) DEVICE — TUBING AIRSEAL TRI-LUMEN FILTERED

## (undated) DEVICE — DRAPE IOBAN 33" X 23"

## (undated) DEVICE — SUT PLEDGET SOFT LARGE 3/8" X 3/16" X 1/16" X6

## (undated) DEVICE — SYR ASEPTO

## (undated) DEVICE — LUBRICANT INST ELECTROLUBE Z SOLUTION

## (undated) DEVICE — ELCTR GROUNDING PAD ADULT COVIDIEN

## (undated) DEVICE — MEDICINE CUP WITH LID 60ML

## (undated) DEVICE — DRSG GAUZE PACKTNER ROLL

## (undated) DEVICE — BLADE SCALPEL SAFETYLOCK #15

## (undated) DEVICE — XI SEAL UNIV 5- 8 MM

## (undated) DEVICE — VALVE SUCTION EVIS 160/200/240

## (undated) DEVICE — NDL HYPO REGULAR BEVEL 22G X 1.5" (TURQUOISE)

## (undated) DEVICE — ENDOCATCH GENERAL 15MM (PURPLE)

## (undated) DEVICE — ELCTR REM POLYHESIVE ADULT PT RETURN 15FT

## (undated) DEVICE — DRSG OPSITE 2.5 X 2"

## (undated) DEVICE — DRAPE MAYO STAND 23"

## (undated) DEVICE — DISSECTOR ENDOSCOPIC KITTNER SINGLE TIP

## (undated) DEVICE — GRASPER LAPA 5MMX35CM

## (undated) DEVICE — POSITIONER STRAP ARMBOARD VELCRO TS-30

## (undated) DEVICE — WARMING BLANKET LOWER ADULT

## (undated) DEVICE — STAPLER COVIDIEN ENDO GIA STANDARD HANDLE

## (undated) DEVICE — MARKING PEN W RULER / LABELS

## (undated) DEVICE — DRSG BENZOIN 0.6CC

## (undated) DEVICE — TUBING OLYMPUS INSUFFLATION

## (undated) DEVICE — DRAPE LARGE SHEET 72X85"

## (undated) DEVICE — SUT SILK 0 24" SH DA

## (undated) DEVICE — SOL INJ LR 1000ML

## (undated) DEVICE — D HELP - CLEARVIEW CLEARIFY SYSTEM

## (undated) DEVICE — SUCTION YANKAUER OPEN TIP NO VENT CURVE

## (undated) DEVICE — TRAP SPECIMEN SPUTUM 40CC

## (undated) DEVICE — SYR LUER LOK 20CC

## (undated) DEVICE — FOLEY TRAY 16FR 5CC LF UMETER CLOSED

## (undated) DEVICE — SUT ETHIBOND 0 30" CT-1 GREEN

## (undated) DEVICE — ELCTR BOVIE TIP BLADE INSULATED 6.5" EDGE

## (undated) DEVICE — ELCTR BOVIE TIP BLADE INSULATED 2.75" EDGE

## (undated) DEVICE — CONNECTOR Y 3/8 X 3/8 X 3/8

## (undated) DEVICE — DRSG CURITY GAUZE SPONGE 4 X 4" 12-PLY

## (undated) DEVICE — XI ARM FORCEP PROGRASP 8MM

## (undated) DEVICE — LABELS BLANK W PEN

## (undated) DEVICE — ENDOCATCH GENERAL 10MM (PURPLE)

## (undated) DEVICE — VALVE BIOPSY BRONCHOVIDEOSCOPE

## (undated) DEVICE — DRAPE 3/4 SHEET 52X76"

## (undated) DEVICE — SUT PDS II 2-0 27" SH

## (undated) DEVICE — VENODYNE/SCD SLEEVE CALF MEDIUM

## (undated) DEVICE — DRSG TELFA 3 X 8

## (undated) DEVICE — ADAPTER FIBEROPTIC BRONCHOSCOPE DUAL AXIS SWIVEL

## (undated) DEVICE — XI DRAPE COLUMN

## (undated) DEVICE — PACK GENERAL LAPAROSCOPY

## (undated) DEVICE — SUT MONOCRYL 4-0 18" PS-2

## (undated) DEVICE — SUT VICRYL 0 27" UR-6

## (undated) DEVICE — DRSG STERISTRIPS 0.5 X 4"

## (undated) DEVICE — SYR BULB 2OZ (BLUE)

## (undated) DEVICE — FOLEY CATH 2-WAY 16FR 5CC SILICONE

## (undated) DEVICE — XI ARM NEEDLE DRIVER SUTURECUT MEGA 8MM

## (undated) DEVICE — DRAPE INSTRUMENT POUCH 6.75" X 11"